# Patient Record
Sex: FEMALE | Race: WHITE
[De-identification: names, ages, dates, MRNs, and addresses within clinical notes are randomized per-mention and may not be internally consistent; named-entity substitution may affect disease eponyms.]

---

## 2017-01-25 ENCOUNTER — HOSPITAL ENCOUNTER (OUTPATIENT)
Dept: HOSPITAL 83 - LAB | Age: 47
Discharge: HOME | End: 2017-01-25
Attending: FAMILY MEDICINE
Payer: COMMERCIAL

## 2017-01-25 DIAGNOSIS — C50.919: ICD-10-CM

## 2017-01-25 DIAGNOSIS — I10: ICD-10-CM

## 2017-01-25 DIAGNOSIS — I25.5: ICD-10-CM

## 2017-01-25 DIAGNOSIS — Z00.01: Primary | ICD-10-CM

## 2017-01-25 LAB
ALBUMIN SERPL-MCNC: 3.3 GM/DL (ref 3.1–4.5)
ALP SERPL-CCNC: 116 U/L (ref 45–117)
ALT SERPL W P-5'-P-CCNC: 24 U/L (ref 12–78)
AST SERPL-CCNC: 15 IU/L (ref 3–35)
BASOPHILS # BLD AUTO: 0 10*3/UL (ref 0–0.1)
BASOPHILS NFR BLD AUTO: 0.2 % (ref 0–1)
BUN SERPL-MCNC: 9 MG/DL (ref 7–24)
CHLORIDE SERPL-SCNC: 106 MMOL/L (ref 98–107)
CHOLEST SERPL-MCNC: 210 MG/DL (ref ?–200)
CO2 SERPL-SCNC: 28 MMOL/L (ref 21–32)
EOSINOPHIL # BLD AUTO: 0.1 10*3/UL (ref 0–0.4)
EOSINOPHIL # BLD AUTO: 2.2 % (ref 1–4)
ERYTHROCYTE [DISTWIDTH] IN BLOOD BY AUTOMATED COUNT: 13.4 % (ref 0–14.5)
EST. AVERAGE GLUCOSE BLD GHB EST-MCNC: 126 MG/DL
GLUCOSE SERPL-MCNC: 78 MG/DL (ref 65–99)
HCT VFR BLD AUTO: 38.7 % (ref 37–47)
HDLC SERPL-MCNC: 61 MG/DL (ref 40–60)
HGB BLD-MCNC: 12.8 G/DL (ref 12–16)
IG #: 0.1 10*3/UL (ref 0–0.1)
LDLC SERPL DIRECT ASSAY-MCNC: 111 MG/DL (ref 9–159)
LYMPHOCYTES # BLD AUTO: 2.2 10*3/UL (ref 1.3–4.4)
LYMPHOCYTES NFR BLD AUTO: 37.2 % (ref 27–41)
MCH RBC QN AUTO: 30.8 PG (ref 27–31)
MCHC RBC AUTO-ENTMCNC: 33.1 G/DL (ref 33–37)
MCV RBC AUTO: 93.3 FL (ref 81–99)
MONOCYTES # BLD AUTO: 0.6 10*3/UL (ref 0.1–1)
MONOCYTES NFR BLD MANUAL: 10.7 % (ref 3–9)
NEUT #: 2.9 10*3/UL (ref 2.3–7.9)
NEUT %: 48.7 % (ref 47–73)
NRBC BLD QL AUTO: 0 % (ref 0–0)
PLATELET # BLD AUTO: 215 10*3/UL (ref 130–400)
PMV BLD AUTO: 9.2 FL (ref 9.6–12.3)
POTASSIUM SERPL-SCNC: 3.9 MMOL/L (ref 3.5–5.1)
PROT SERPL-MCNC: 7.1 GM/DL (ref 6.4–8.2)
RBC # BLD AUTO: 4.15 10*6/UL (ref 4.1–5.1)
SODIUM SERPL-SCNC: 143 MMOL/L (ref 136–145)
TRIGL SERPL-MCNC: 188 MG/DL (ref ?–150)
TSH SERPL DL<=0.005 MIU/L-ACNC: 1.39 UIU/ML (ref 0.36–4.75)
VLDLC SERPL CALC-MCNC: 38 MG/DL (ref 6–40)
WBC NRBC COR # BLD AUTO: 6 10*3/UL (ref 4.8–10.8)

## 2017-03-15 ENCOUNTER — HOSPITAL ENCOUNTER (INPATIENT)
Dept: HOSPITAL 83 - ED | Age: 47
LOS: 3 days | Discharge: HOME | DRG: 193 | End: 2017-03-18
Attending: INTERNAL MEDICINE | Admitting: INTERNAL MEDICINE
Payer: COMMERCIAL

## 2017-03-15 VITALS — HEIGHT: 61.81 IN | WEIGHT: 205.03 LBS | BODY MASS INDEX: 37.73 KG/M2

## 2017-03-15 DIAGNOSIS — J96.01: ICD-10-CM

## 2017-03-15 DIAGNOSIS — I25.2: ICD-10-CM

## 2017-03-15 DIAGNOSIS — Z91.048: ICD-10-CM

## 2017-03-15 DIAGNOSIS — I10: ICD-10-CM

## 2017-03-15 DIAGNOSIS — Z87.01: ICD-10-CM

## 2017-03-15 DIAGNOSIS — Z80.8: ICD-10-CM

## 2017-03-15 DIAGNOSIS — E27.8: ICD-10-CM

## 2017-03-15 DIAGNOSIS — C50.911: ICD-10-CM

## 2017-03-15 DIAGNOSIS — J44.0: ICD-10-CM

## 2017-03-15 DIAGNOSIS — Z71.6: ICD-10-CM

## 2017-03-15 DIAGNOSIS — Z90.13: ICD-10-CM

## 2017-03-15 DIAGNOSIS — D68.59: ICD-10-CM

## 2017-03-15 DIAGNOSIS — J18.9: Primary | ICD-10-CM

## 2017-03-15 DIAGNOSIS — G89.29: ICD-10-CM

## 2017-03-15 DIAGNOSIS — Z79.1: ICD-10-CM

## 2017-03-15 DIAGNOSIS — J44.1: ICD-10-CM

## 2017-03-15 DIAGNOSIS — Z88.8: ICD-10-CM

## 2017-03-15 DIAGNOSIS — F33.9: ICD-10-CM

## 2017-03-15 DIAGNOSIS — Z92.21: ICD-10-CM

## 2017-03-15 DIAGNOSIS — Z79.899: ICD-10-CM

## 2017-03-15 DIAGNOSIS — M25.562: ICD-10-CM

## 2017-03-15 DIAGNOSIS — Z82.49: ICD-10-CM

## 2017-03-15 DIAGNOSIS — J45.909: ICD-10-CM

## 2017-03-15 DIAGNOSIS — Z83.6: ICD-10-CM

## 2017-03-15 DIAGNOSIS — M25.561: ICD-10-CM

## 2017-03-15 DIAGNOSIS — E43: ICD-10-CM

## 2017-03-15 LAB
ALBUMIN SERPL-MCNC: 3.1 GM/DL (ref 3.1–4.5)
ALBUMIN SERPL-MCNC: NEGATIVE G/DL
ALP SERPL-CCNC: 114 U/L (ref 45–117)
ALT SERPL W P-5'-P-CCNC: 19 U/L (ref 12–78)
APPEARANCE UR: (no result)
AST SERPL-CCNC: 13 IU/L (ref 3–35)
BACTERIA #/AREA URNS HPF: (no result) /[HPF]
BASOPHILS # BLD AUTO: 0 10*3/UL (ref 0–0.1)
BASOPHILS NFR BLD AUTO: 0.3 % (ref 0–1)
BILIRUB UR QL STRIP: NEGATIVE
BUN SERPL-MCNC: 11 MG/DL (ref 7–24)
CHLORIDE SERPL-SCNC: 104 MMOL/L (ref 98–107)
CK MB SERPL-MCNC: < 0.5 NG/ML (ref 0.5–3.6)
CK SERPL-CCNC: 53 U/L (ref 26–192)
CK SERPL-CCNC: 53 U/L (ref 26–192)
CK SERPL-CCNC: 66 U/L (ref 26–192)
CO2 SERPL-SCNC: 29 MMOL/L (ref 21–32)
COLOR UR: YELLOW
CRP SERPL-MCNC: 1.31 MG/DL (ref 0–0.3)
EOSINOPHIL # BLD AUTO: 0.2 10*3/UL (ref 0–0.4)
EOSINOPHIL # BLD AUTO: 2.8 % (ref 1–4)
ERYTHROCYTE [DISTWIDTH] IN BLOOD BY AUTOMATED COUNT: 13.2 % (ref 0–14.5)
GLUCOSE SERPL-MCNC: 84 MG/DL (ref 65–99)
GLUCOSE UR QL: NEGATIVE
HCT VFR BLD AUTO: 36.6 % (ref 37–47)
HGB BLD-MCNC: 12 G/DL (ref 12–16)
HGB UR QL STRIP: NEGATIVE
IG #: 0 10*3/UL (ref 0–0.1)
INR BLD: 0.9 (ref 2–3.5)
KETONES UR QL STRIP: NEGATIVE
LEUKOCYTE ESTERASE UR QL STRIP: NEGATIVE
LYMPHOCYTES # BLD AUTO: 2.2 10*3/UL (ref 1.3–4.4)
LYMPHOCYTES NFR BLD AUTO: 35.8 % (ref 27–41)
MAGNESIUM SERPL-MCNC: 1.7 MG/DL (ref 1.5–2.1)
MCH RBC QN AUTO: 30.5 PG (ref 27–31)
MCHC RBC AUTO-ENTMCNC: 32.8 G/DL (ref 33–37)
MCV RBC AUTO: 93.1 FL (ref 81–99)
MONOCYTES # BLD AUTO: 0.8 10*3/UL (ref 0.1–1)
MONOCYTES NFR BLD MANUAL: 13.5 % (ref 3–9)
MUCOUS THREADS URNS QL MICRO: (no result)
MYOGLOBIN SERPL-MCNC: 37 NG/ML (ref 13–71)
NEUT #: 2.9 10*3/UL (ref 2.3–7.9)
NEUT %: 47 % (ref 47–73)
NITRITE UR QL STRIP: NEGATIVE
NRBC BLD QL AUTO: 0 10*3/UL (ref 0–0)
PH UR STRIP: 6 [PH] (ref 5–9)
PLATELET # BLD AUTO: 227 10*3/UL (ref 130–400)
PMV BLD AUTO: 9.1 FL (ref 9.6–12.3)
POTASSIUM SERPL-SCNC: 3.7 MMOL/L (ref 3.5–5.1)
PROT SERPL-MCNC: 6.9 GM/DL (ref 6.4–8.2)
PROTHROMBIN TIME: 10 SECONDS (ref 9–12.4)
RBC # BLD AUTO: 3.93 10*6/UL (ref 4.1–5.1)
RBC #/AREA URNS HPF: (no result) RBC/HPF (ref 0–2)
SODIUM SERPL-SCNC: 141 MMOL/L (ref 136–145)
SP GR UR: 1.02 (ref 1–1.03)
TROPONIN I SERPL-MCNC: < 0.015 NG/ML (ref ?–0.04)
URINE REFLEX COMMENT: YES
UROBILINOGEN UR STRIP-MCNC: 0.2 E.U./DL (ref 0.2–1)
WBC #/AREA URNS HPF: (no result) WBC/HPF (ref 0–5)
WBC NRBC COR # BLD AUTO: 6.2 10*3/UL (ref 4.8–10.8)

## 2017-03-16 LAB
25(OH)D3 SERPL-MCNC: 24.2 NG/ML (ref 30–100)
ALBUMIN SERPL-MCNC: 3.2 GM/DL (ref 3.1–4.5)
ALP SERPL-CCNC: 105 U/L (ref 45–117)
ALT SERPL W P-5'-P-CCNC: 18 U/L (ref 12–78)
AST SERPL-CCNC: 9 IU/L (ref 3–35)
BASOPHILS # BLD AUTO: 0 10*3/UL (ref 0–0.1)
BASOPHILS NFR BLD AUTO: 0.1 % (ref 0–1)
BUN SERPL-MCNC: 12 MG/DL (ref 7–24)
CHLORIDE SERPL-SCNC: 104 MMOL/L (ref 98–107)
CK MB SERPL-MCNC: < 0.5 NG/ML (ref 0.5–3.6)
CK SERPL-CCNC: 65 U/L (ref 26–192)
CO2 SERPL-SCNC: 27 MMOL/L (ref 21–32)
EOSINOPHIL # BLD AUTO: 0 % (ref 1–4)
EOSINOPHIL # BLD AUTO: 0 10*3/UL (ref 0–0.4)
ERYTHROCYTE [DISTWIDTH] IN BLOOD BY AUTOMATED COUNT: 13.2 % (ref 0–14.5)
EST. AVERAGE GLUCOSE BLD GHB EST-MCNC: 114 MG/DL
FOLATE SERPL-MCNC: 6.34 NG/ML (ref 5.38–?)
GLUCOSE SERPL-MCNC: 162 MG/DL (ref 65–99)
HCT VFR BLD AUTO: 36.7 % (ref 37–47)
HGB BLD-MCNC: 11.9 G/DL (ref 12–16)
IG #: 0.1 10*3/UL (ref 0–0.1)
INR BLD: 1 (ref 2–3.5)
LYMPHOCYTES # BLD AUTO: 1 10*3/UL (ref 1.3–4.4)
LYMPHOCYTES NFR BLD AUTO: 9.9 % (ref 27–41)
MAGNESIUM SERPL-MCNC: 2 MG/DL (ref 1.5–2.1)
MCH RBC QN AUTO: 30.4 PG (ref 27–31)
MCHC RBC AUTO-ENTMCNC: 32.4 G/DL (ref 33–37)
MCV RBC AUTO: 93.9 FL (ref 81–99)
MONOCYTES # BLD AUTO: 0.6 10*3/UL (ref 0.1–1)
MONOCYTES NFR BLD MANUAL: 6 % (ref 3–9)
NEUT #: 8.4 10*3/UL (ref 2.3–7.9)
NEUT %: 83.2 % (ref 47–73)
NRBC BLD QL AUTO: 0 % (ref 0–0)
PHOSPHATE SERPL-MCNC: 1.4 MG/DL (ref 2.5–4.9)
PLATELET # BLD AUTO: 247 10*3/UL (ref 130–400)
PMV BLD AUTO: 9.5 FL (ref 9.6–12.3)
POTASSIUM SERPL-SCNC: 3.9 MMOL/L (ref 3.5–5.1)
PROT SERPL-MCNC: 7 GM/DL (ref 6.4–8.2)
PROTHROMBIN TIME: 10.2 SECONDS (ref 9–12.4)
RBC # BLD AUTO: 3.91 10*6/UL (ref 4.1–5.1)
SODIUM SERPL-SCNC: 142 MMOL/L (ref 136–145)
T4 FREE SERPL-MCNC: 0.96 NG/DL (ref 0.76–1.46)
TROPONIN I SERPL-MCNC: < 0.015 NG/ML (ref ?–0.04)
TSH SERPL DL<=0.005 MIU/L-ACNC: 0.22 UIU/ML (ref 0.36–4.75)
VITAMIN B12: 316 PG/ML (ref 247–911)
WBC NRBC COR # BLD AUTO: 10.1 10*3/UL (ref 4.8–10.8)

## 2017-03-17 LAB
ALBUMIN SERPL-MCNC: 3.1 GM/DL (ref 3.1–4.5)
ALP SERPL-CCNC: 93 U/L (ref 45–117)
ALT SERPL W P-5'-P-CCNC: 21 U/L (ref 12–78)
AST SERPL-CCNC: 12 IU/L (ref 3–35)
BASOPHILS # BLD AUTO: 0 10*3/UL (ref 0–0.1)
BASOPHILS NFR BLD AUTO: 0.1 % (ref 0–1)
BUN SERPL-MCNC: 13 MG/DL (ref 7–24)
CHLORIDE SERPL-SCNC: 107 MMOL/L (ref 98–107)
CO2 SERPL-SCNC: 25 MMOL/L (ref 21–32)
EOSINOPHIL # BLD AUTO: 0 % (ref 1–4)
EOSINOPHIL # BLD AUTO: 0 10*3/UL (ref 0–0.4)
ERYTHROCYTE [DISTWIDTH] IN BLOOD BY AUTOMATED COUNT: 13.5 % (ref 0–14.5)
GLUCOSE SERPL-MCNC: 184 MG/DL (ref 65–99)
HCT VFR BLD AUTO: 36.5 % (ref 37–47)
HGB BLD-MCNC: 11.6 G/DL (ref 12–16)
IG #: 0.3 10*3/UL (ref 0–0.1)
INR BLD: 0.9 (ref 2–3.5)
LYMPHOCYTES # BLD AUTO: 1.1 10*3/UL (ref 1.3–4.4)
LYMPHOCYTES NFR BLD AUTO: 7.9 % (ref 27–41)
MCH RBC QN AUTO: 30.1 PG (ref 27–31)
MCHC RBC AUTO-ENTMCNC: 31.8 G/DL (ref 33–37)
MCV RBC AUTO: 94.8 FL (ref 81–99)
MONOCYTES # BLD AUTO: 0.7 10*3/UL (ref 0.1–1)
MONOCYTES NFR BLD MANUAL: 5 % (ref 3–9)
NEUT #: 11.8 10*3/UL (ref 2.3–7.9)
NEUT %: 85.2 % (ref 47–73)
NRBC BLD QL AUTO: 0.1 % (ref 0–0)
PLATELET # BLD AUTO: 227 10*3/UL (ref 130–400)
PMV BLD AUTO: 9.5 FL (ref 9.6–12.3)
POTASSIUM SERPL-SCNC: 3.9 MMOL/L (ref 3.5–5.1)
PROT SERPL-MCNC: 6.7 GM/DL (ref 6.4–8.2)
PROTHROMBIN TIME: 9.9 SECONDS (ref 9–12.4)
RBC # BLD AUTO: 3.85 10*6/UL (ref 4.1–5.1)
SODIUM SERPL-SCNC: 144 MMOL/L (ref 136–145)
TROPONIN I SERPL-MCNC: < 0.015 NG/ML (ref ?–0.04)
WBC NRBC COR # BLD AUTO: 13.9 10*3/UL (ref 4.8–10.8)

## 2017-03-17 PROCEDURE — 3E0U3BZ INTRODUCTION OF ANESTHETIC AGENT INTO JOINTS, PERCUTANEOUS APPROACH: ICD-10-PCS | Performed by: ORTHOPAEDIC SURGERY

## 2017-03-17 PROCEDURE — 3E0U33Z INTRODUCTION OF ANTI-INFLAMMATORY INTO JOINTS, PERCUTANEOUS APPROACH: ICD-10-PCS | Performed by: ORTHOPAEDIC SURGERY

## 2017-03-17 PROCEDURE — 0S9C3ZZ DRAINAGE OF RIGHT KNEE JOINT, PERCUTANEOUS APPROACH: ICD-10-PCS | Performed by: ORTHOPAEDIC SURGERY

## 2017-06-10 ENCOUNTER — HOSPITAL ENCOUNTER (INPATIENT)
Dept: HOSPITAL 83 - ED | Age: 47
LOS: 2 days | Discharge: HOME | DRG: 177 | End: 2017-06-12
Attending: INTERNAL MEDICINE | Admitting: INTERNAL MEDICINE
Payer: COMMERCIAL

## 2017-06-10 VITALS — SYSTOLIC BLOOD PRESSURE: 127 MMHG | DIASTOLIC BLOOD PRESSURE: 79 MMHG

## 2017-06-10 VITALS — SYSTOLIC BLOOD PRESSURE: 116 MMHG | DIASTOLIC BLOOD PRESSURE: 65 MMHG

## 2017-06-10 VITALS — BODY MASS INDEX: 38.29 KG/M2 | WEIGHT: 208.06 LBS | HEIGHT: 61.97 IN

## 2017-06-10 VITALS — SYSTOLIC BLOOD PRESSURE: 122 MMHG | DIASTOLIC BLOOD PRESSURE: 68 MMHG

## 2017-06-10 VITALS — SYSTOLIC BLOOD PRESSURE: 136 MMHG | DIASTOLIC BLOOD PRESSURE: 72 MMHG

## 2017-06-10 VITALS — DIASTOLIC BLOOD PRESSURE: 106 MMHG | SYSTOLIC BLOOD PRESSURE: 143 MMHG

## 2017-06-10 DIAGNOSIS — Z90.13: ICD-10-CM

## 2017-06-10 DIAGNOSIS — Z91.048: ICD-10-CM

## 2017-06-10 DIAGNOSIS — Z79.1: ICD-10-CM

## 2017-06-10 DIAGNOSIS — G62.9: ICD-10-CM

## 2017-06-10 DIAGNOSIS — F17.210: ICD-10-CM

## 2017-06-10 DIAGNOSIS — I25.2: ICD-10-CM

## 2017-06-10 DIAGNOSIS — I10: ICD-10-CM

## 2017-06-10 DIAGNOSIS — K59.03: ICD-10-CM

## 2017-06-10 DIAGNOSIS — J15.6: Primary | ICD-10-CM

## 2017-06-10 DIAGNOSIS — T40.2X5A: ICD-10-CM

## 2017-06-10 DIAGNOSIS — D70.1: ICD-10-CM

## 2017-06-10 DIAGNOSIS — E78.1: ICD-10-CM

## 2017-06-10 DIAGNOSIS — D68.59: ICD-10-CM

## 2017-06-10 DIAGNOSIS — Z79.899: ICD-10-CM

## 2017-06-10 DIAGNOSIS — J96.01: ICD-10-CM

## 2017-06-10 DIAGNOSIS — T82.898A: ICD-10-CM

## 2017-06-10 DIAGNOSIS — F33.9: ICD-10-CM

## 2017-06-10 DIAGNOSIS — G89.29: ICD-10-CM

## 2017-06-10 DIAGNOSIS — J44.9: ICD-10-CM

## 2017-06-10 DIAGNOSIS — E55.9: ICD-10-CM

## 2017-06-10 DIAGNOSIS — Z80.8: ICD-10-CM

## 2017-06-10 DIAGNOSIS — F41.9: ICD-10-CM

## 2017-06-10 DIAGNOSIS — Z79.51: ICD-10-CM

## 2017-06-10 DIAGNOSIS — Z88.8: ICD-10-CM

## 2017-06-10 LAB
ALBUMIN SERPL-MCNC: 3.7 GM/DL (ref 3.1–4.5)
ALBUMIN SERPL-MCNC: NEGATIVE G/DL
ALP SERPL-CCNC: 149 U/L (ref 45–117)
ALT SERPL W P-5'-P-CCNC: 18 U/L (ref 12–78)
APPEARANCE UR: (no result)
AST SERPL-CCNC: 15 IU/L (ref 3–35)
BACTERIA #/AREA URNS HPF: (no result) /[HPF]
BASOPHILS # BLD AUTO: 0 10*3/UL (ref 0–0.1)
BASOPHILS NFR BLD AUTO: 0.4 % (ref 0–1)
BILIRUB UR QL STRIP: NEGATIVE
BUN SERPL-MCNC: 10 MG/DL (ref 7–24)
CHLORIDE SERPL-SCNC: 102 MMOL/L (ref 98–107)
CO2 SERPL-SCNC: 31 MMOL/L (ref 21–32)
COLOR UR: YELLOW
EOSINOPHIL # BLD AUTO: 0.1 10*3/UL (ref 0–0.4)
EOSINOPHIL # BLD AUTO: 2.6 % (ref 1–4)
EPI CELLS #/AREA URNS HPF: (no result) /[HPF]
ERYTHROCYTE [DISTWIDTH] IN BLOOD BY AUTOMATED COUNT: 16.2 % (ref 0–14.5)
GLUCOSE SERPL-MCNC: 76 MG/DL (ref 65–99)
GLUCOSE UR QL: NEGATIVE
HCT VFR BLD AUTO: 36.2 % (ref 37–47)
HGB BLD-MCNC: 12.2 G/DL (ref 12–16)
HGB UR QL STRIP: NEGATIVE
IG #: 0 10*3/UL (ref 0–0.1)
INR BLD: 1 (ref 2–3.5)
KETONES UR QL STRIP: NEGATIVE
LEUKOCYTE ESTERASE UR QL STRIP: NEGATIVE
LYMPHOCYTES # BLD AUTO: 2 10*3/UL (ref 1.3–4.4)
LYMPHOCYTES NFR BLD AUTO: 38.7 % (ref 27–41)
MCH RBC QN AUTO: 31.5 PG (ref 27–31)
MCHC RBC AUTO-ENTMCNC: 33.7 G/DL (ref 33–37)
MCV RBC AUTO: 93.5 FL (ref 81–99)
MONOCYTES # BLD AUTO: 0.7 10*3/UL (ref 0.1–1)
MONOCYTES NFR BLD MANUAL: 14 % (ref 3–9)
NEUT #: 2.2 10*3/UL (ref 2.3–7.9)
NEUT %: 44.1 % (ref 47–73)
NITRITE UR QL STRIP: NEGATIVE
NRBC BLD QL AUTO: 0 % (ref 0–0)
PH UR STRIP: 8 [PH] (ref 5–9)
PLATELET # BLD AUTO: 193 10*3/UL (ref 130–400)
PMV BLD AUTO: 8.7 FL (ref 9.6–12.3)
POTASSIUM SERPL-SCNC: 3.6 MMOL/L (ref 3.5–5.1)
PROT SERPL-MCNC: 7.3 GM/DL (ref 6.4–8.2)
PROTHROMBIN TIME: 10.3 SECONDS (ref 9–12.4)
RBC # BLD AUTO: 3.87 10*6/UL (ref 4.1–5.1)
SODIUM SERPL-SCNC: 142 MMOL/L (ref 136–145)
SP GR UR: 1.01 (ref 1–1.03)
URINE REFLEX COMMENT: NO
UROBILINOGEN UR STRIP-MCNC: 1 E.U./DL (ref 0.2–1)
WBC #/AREA URNS HPF: (no result) WBC/HPF (ref 0–5)
WBC NRBC COR # BLD AUTO: 5.1 10*3/UL (ref 4.8–10.8)

## 2017-06-11 VITALS — DIASTOLIC BLOOD PRESSURE: 89 MMHG

## 2017-06-11 VITALS — DIASTOLIC BLOOD PRESSURE: 40 MMHG

## 2017-06-11 VITALS — DIASTOLIC BLOOD PRESSURE: 98 MMHG

## 2017-06-11 VITALS — DIASTOLIC BLOOD PRESSURE: 57 MMHG

## 2017-06-12 VITALS — DIASTOLIC BLOOD PRESSURE: 67 MMHG | SYSTOLIC BLOOD PRESSURE: 127 MMHG

## 2017-06-12 VITALS — DIASTOLIC BLOOD PRESSURE: 56 MMHG | SYSTOLIC BLOOD PRESSURE: 118 MMHG

## 2017-06-12 LAB
BASOPHILS # BLD AUTO: 0 10*3/UL (ref 0–0.1)
BASOPHILS NFR BLD AUTO: 0.1 % (ref 0–1)
BUN SERPL-MCNC: 17 MG/DL (ref 7–24)
CHLORIDE SERPL-SCNC: 102 MMOL/L (ref 98–107)
CO2 SERPL-SCNC: 29 MMOL/L (ref 21–32)
EOSINOPHIL # BLD AUTO: 0 % (ref 1–4)
EOSINOPHIL # BLD AUTO: 0 10*3/UL (ref 0–0.4)
ERYTHROCYTE [DISTWIDTH] IN BLOOD BY AUTOMATED COUNT: 17.1 % (ref 0–14.5)
GLUCOSE SERPL-MCNC: 171 MG/DL (ref 65–99)
HCT VFR BLD AUTO: 33.6 % (ref 37–47)
HGB BLD-MCNC: 10.8 G/DL (ref 12–16)
IG #: 0.1 10*3/UL (ref 0–0.1)
LYMPHOCYTES # BLD AUTO: 1 10*3/UL (ref 1.3–4.4)
LYMPHOCYTES NFR BLD AUTO: 11.4 % (ref 27–41)
MCH RBC QN AUTO: 31.6 PG (ref 27–31)
MCHC RBC AUTO-ENTMCNC: 32.1 G/DL (ref 33–37)
MCV RBC AUTO: 98.2 FL (ref 81–99)
MONOCYTES # BLD AUTO: 0.4 10*3/UL (ref 0.1–1)
MONOCYTES NFR BLD MANUAL: 4.1 % (ref 3–9)
NEUT #: 7.1 10*3/UL (ref 2.3–7.9)
NEUT %: 83 % (ref 47–73)
NRBC BLD QL AUTO: 0 % (ref 0–0)
PLATELET # BLD AUTO: 178 10*3/UL (ref 130–400)
PMV BLD AUTO: 9.4 FL (ref 9.6–12.3)
POTASSIUM SERPL-SCNC: 4.1 MMOL/L (ref 3.5–5.1)
RBC # BLD AUTO: 3.42 10*6/UL (ref 4.1–5.1)
SODIUM SERPL-SCNC: 140 MMOL/L (ref 136–145)
WBC NRBC COR # BLD AUTO: 8.5 10*3/UL (ref 4.8–10.8)

## 2017-07-13 ENCOUNTER — HOSPITAL ENCOUNTER (OUTPATIENT)
Dept: HOSPITAL 83 - LAB | Age: 47
Discharge: HOME | End: 2017-07-13
Payer: COMMERCIAL

## 2017-07-13 DIAGNOSIS — C50.919: Primary | ICD-10-CM

## 2017-07-13 LAB
ALBUMIN SERPL-MCNC: 3.7 GM/DL (ref 3.1–4.5)
ALP SERPL-CCNC: 173 U/L (ref 45–117)
ALT SERPL W P-5'-P-CCNC: 25 U/L (ref 12–78)
AST SERPL-CCNC: 20 IU/L (ref 3–35)
BASOPHILS # BLD AUTO: 0 10*3/UL (ref 0–0.1)
BASOPHILS NFR BLD AUTO: 0.4 % (ref 0–1)
BUN SERPL-MCNC: 15 MG/DL (ref 7–24)
CHLORIDE SERPL-SCNC: 104 MMOL/L (ref 98–107)
CO2 SERPL-SCNC: 24 MMOL/L (ref 21–32)
EOSINOPHIL # BLD AUTO: 0.2 10*3/UL (ref 0–0.4)
EOSINOPHIL # BLD AUTO: 1.9 % (ref 1–4)
ERYTHROCYTE [DISTWIDTH] IN BLOOD BY AUTOMATED COUNT: 19.4 % (ref 0–14.5)
GLUCOSE SERPL-MCNC: 97 MG/DL (ref 65–99)
HCT VFR BLD AUTO: 36 % (ref 37–47)
HGB BLD-MCNC: 12.3 G/DL (ref 12–16)
IG #: 0.1 10*3/UL (ref 0–0.1)
LYMPHOCYTES # BLD AUTO: 2 10*3/UL (ref 1.3–4.4)
LYMPHOCYTES NFR BLD AUTO: 26 % (ref 27–41)
MCH RBC QN AUTO: 33.5 PG (ref 27–31)
MCHC RBC AUTO-ENTMCNC: 34.2 G/DL (ref 33–37)
MCV RBC AUTO: 98.1 FL (ref 81–99)
MONOCYTES # BLD AUTO: 1.1 10*3/UL (ref 0.1–1)
MONOCYTES NFR BLD MANUAL: 13.6 % (ref 3–9)
NEUT #: 4.4 10*3/UL (ref 2.3–7.9)
NEUT %: 56.5 % (ref 47–73)
NRBC BLD QL AUTO: 0 10*3/UL (ref 0–0)
PLATELET # BLD AUTO: 205 10*3/UL (ref 130–400)
PMV BLD AUTO: 9.1 FL (ref 9.6–12.3)
POTASSIUM SERPL-SCNC: 3.8 MMOL/L (ref 3.5–5.1)
PROT SERPL-MCNC: 7 GM/DL (ref 6.4–8.2)
RBC # BLD AUTO: 3.67 10*6/UL (ref 4.1–5.1)
SODIUM SERPL-SCNC: 138 MMOL/L (ref 136–145)
WBC NRBC COR # BLD AUTO: 7.7 10*3/UL (ref 4.8–10.8)

## 2017-08-02 ENCOUNTER — HOSPITAL ENCOUNTER (INPATIENT)
Dept: HOSPITAL 83 - ED | Age: 47
LOS: 5 days | Discharge: HOME HEALTH | DRG: 190 | End: 2017-08-07
Attending: INTERNAL MEDICINE | Admitting: INTERNAL MEDICINE
Payer: COMMERCIAL

## 2017-08-02 VITALS — BODY MASS INDEX: 38.47 KG/M2 | HEIGHT: 61.97 IN | WEIGHT: 209.06 LBS

## 2017-08-02 VITALS — SYSTOLIC BLOOD PRESSURE: 103 MMHG | DIASTOLIC BLOOD PRESSURE: 64 MMHG

## 2017-08-02 VITALS — DIASTOLIC BLOOD PRESSURE: 90 MMHG | SYSTOLIC BLOOD PRESSURE: 142 MMHG

## 2017-08-02 VITALS — DIASTOLIC BLOOD PRESSURE: 90 MMHG

## 2017-08-02 VITALS — DIASTOLIC BLOOD PRESSURE: 83 MMHG

## 2017-08-02 DIAGNOSIS — Z88.8: ICD-10-CM

## 2017-08-02 DIAGNOSIS — R73.9: ICD-10-CM

## 2017-08-02 DIAGNOSIS — I10: ICD-10-CM

## 2017-08-02 DIAGNOSIS — E44.0: ICD-10-CM

## 2017-08-02 DIAGNOSIS — M54.5: ICD-10-CM

## 2017-08-02 DIAGNOSIS — J18.9: ICD-10-CM

## 2017-08-02 DIAGNOSIS — G89.29: ICD-10-CM

## 2017-08-02 DIAGNOSIS — J20.9: ICD-10-CM

## 2017-08-02 DIAGNOSIS — M19.90: ICD-10-CM

## 2017-08-02 DIAGNOSIS — Z92.21: ICD-10-CM

## 2017-08-02 DIAGNOSIS — J44.0: Primary | ICD-10-CM

## 2017-08-02 DIAGNOSIS — I25.2: ICD-10-CM

## 2017-08-02 DIAGNOSIS — J45.51: ICD-10-CM

## 2017-08-02 DIAGNOSIS — Z80.8: ICD-10-CM

## 2017-08-02 DIAGNOSIS — Z82.3: ICD-10-CM

## 2017-08-02 DIAGNOSIS — E66.9: ICD-10-CM

## 2017-08-02 DIAGNOSIS — R74.0: ICD-10-CM

## 2017-08-02 DIAGNOSIS — F32.9: ICD-10-CM

## 2017-08-02 DIAGNOSIS — F41.1: ICD-10-CM

## 2017-08-02 DIAGNOSIS — J44.1: ICD-10-CM

## 2017-08-02 DIAGNOSIS — Z90.13: ICD-10-CM

## 2017-08-02 DIAGNOSIS — Z91.048: ICD-10-CM

## 2017-08-02 DIAGNOSIS — Z83.3: ICD-10-CM

## 2017-08-02 DIAGNOSIS — C50.919: ICD-10-CM

## 2017-08-02 DIAGNOSIS — Z90.721: ICD-10-CM

## 2017-08-02 DIAGNOSIS — Z79.899: ICD-10-CM

## 2017-08-02 DIAGNOSIS — D63.8: ICD-10-CM

## 2017-08-02 DIAGNOSIS — F17.210: ICD-10-CM

## 2017-08-02 DIAGNOSIS — K21.9: ICD-10-CM

## 2017-08-02 DIAGNOSIS — Z82.49: ICD-10-CM

## 2017-08-02 LAB
ALBUMIN SERPL-MCNC: (no result) G/DL
ALBUMIN SERPL-MCNC: 3.4 GM/DL (ref 3.1–4.5)
ALP SERPL-CCNC: 151 U/L (ref 45–117)
ALT SERPL W P-5'-P-CCNC: 28 U/L (ref 12–78)
APPEARANCE UR: CLEAR
AST SERPL-CCNC: 43 IU/L (ref 3–35)
BACTERIA #/AREA URNS HPF: (no result) /[HPF]
BASOPHILS # BLD AUTO: 0 10*3/UL (ref 0–0.1)
BASOPHILS NFR BLD AUTO: 0.4 % (ref 0–1)
BILIRUB UR QL STRIP: NEGATIVE
BUN SERPL-MCNC: 9 MG/DL (ref 7–24)
CHLORIDE SERPL-SCNC: 101 MMOL/L (ref 98–107)
CO2 SERPL-SCNC: 27 MMOL/L (ref 21–32)
COLOR UR: YELLOW
EOSINOPHIL # BLD AUTO: 0.1 10*3/UL (ref 0–0.4)
EOSINOPHIL # BLD AUTO: 2.1 % (ref 1–4)
EPI CELLS #/AREA URNS HPF: (no result) /[HPF]
ERYTHROCYTE [DISTWIDTH] IN BLOOD BY AUTOMATED COUNT: 19.8 % (ref 0–14.5)
GLUCOSE SERPL-MCNC: 120 MG/DL (ref 65–99)
GLUCOSE UR QL: NEGATIVE
HCT VFR BLD AUTO: 31.3 % (ref 37–47)
HGB BLD-MCNC: 10.2 G/DL (ref 12–16)
HGB UR QL STRIP: NEGATIVE
IG #: 0.1 10*3/UL (ref 0–0.1)
INR BLD: 1 (ref 2–3.5)
KETONES UR QL STRIP: NEGATIVE
LA>2 REFLEX 2 HR: (no result)
LEUKOCYTE ESTERASE UR QL STRIP: NEGATIVE
LYMPHOCYTES # BLD AUTO: 0.8 10*3/UL (ref 1.3–4.4)
LYMPHOCYTES NFR BLD AUTO: 14.8 % (ref 27–41)
MCH RBC QN AUTO: 34.3 PG (ref 27–31)
MCHC RBC AUTO-ENTMCNC: 32.6 G/DL (ref 33–37)
MCV RBC AUTO: 105.4 FL (ref 81–99)
MONOCYTES # BLD AUTO: 0.2 10*3/UL (ref 0.1–1)
MONOCYTES NFR BLD MANUAL: 3.9 % (ref 3–9)
NEUT #: 4.4 10*3/UL (ref 2.3–7.9)
NEUT %: 76.5 % (ref 47–73)
NITRITE UR QL STRIP: NEGATIVE
NRBC BLD QL AUTO: 0.9 % (ref 0–0)
PH UR STRIP: 7.5 [PH] (ref 5–9)
PLATELET # BLD AUTO: 197 10*3/UL (ref 130–400)
PMV BLD AUTO: 9.1 FL (ref 9.6–12.3)
POTASSIUM SERPL-SCNC: 4.4 MMOL/L (ref 3.5–5.1)
PROT SERPL-MCNC: 6.7 GM/DL (ref 6.4–8.2)
PROTHROMBIN TIME: 10.2 SECONDS (ref 8.9–12.2)
RBC # BLD AUTO: 2.97 10*6/UL (ref 4.1–5.1)
RBC #/AREA URNS HPF: (no result) RBC/HPF (ref 0–2)
SODIUM SERPL-SCNC: 139 MMOL/L (ref 136–145)
SP GR UR: 1.01 (ref 1–1.03)
TROPONIN I SERPL-MCNC: < 0.015 NG/ML (ref ?–0.04)
URINE REFLEX COMMENT: NO
UROBILINOGEN UR STRIP-MCNC: 2 E.U./DL (ref 0.2–1)
WBC #/AREA URNS HPF: (no result) WBC/HPF (ref 0–5)
WBC NRBC COR # BLD AUTO: 5.7 10*3/UL (ref 4.8–10.8)

## 2017-08-03 VITALS — DIASTOLIC BLOOD PRESSURE: 80 MMHG

## 2017-08-03 VITALS — DIASTOLIC BLOOD PRESSURE: 84 MMHG

## 2017-08-03 VITALS — SYSTOLIC BLOOD PRESSURE: 137 MMHG | DIASTOLIC BLOOD PRESSURE: 89 MMHG

## 2017-08-03 VITALS — SYSTOLIC BLOOD PRESSURE: 138 MMHG | DIASTOLIC BLOOD PRESSURE: 89 MMHG

## 2017-08-03 VITALS — DIASTOLIC BLOOD PRESSURE: 88 MMHG | SYSTOLIC BLOOD PRESSURE: 139 MMHG

## 2017-08-03 VITALS — DIASTOLIC BLOOD PRESSURE: 58 MMHG

## 2017-08-03 LAB
ALBUMIN SERPL-MCNC: 2.9 GM/DL (ref 3.1–4.5)
ALP SERPL-CCNC: 147 U/L (ref 45–117)
ALT SERPL W P-5'-P-CCNC: 23 U/L (ref 12–78)
AST SERPL-CCNC: 17 IU/L (ref 3–35)
BASOPHILS # BLD AUTO: 0 10*3/UL (ref 0–0.1)
BASOPHILS NFR BLD AUTO: 0.1 % (ref 0–1)
BUN SERPL-MCNC: 10 MG/DL (ref 7–24)
BUN SERPL-MCNC: 9 MG/DL (ref 7–24)
CHLORIDE SERPL-SCNC: 104 MMOL/L (ref 98–107)
CHLORIDE SERPL-SCNC: 107 MMOL/L (ref 98–107)
CO2 SERPL-SCNC: 25 MMOL/L (ref 21–32)
CO2 SERPL-SCNC: 29 MMOL/L (ref 21–32)
EOSINOPHIL # BLD AUTO: 0 % (ref 1–4)
EOSINOPHIL # BLD AUTO: 0 10*3/UL (ref 0–0.4)
ERYTHROCYTE [DISTWIDTH] IN BLOOD BY AUTOMATED COUNT: 19.7 % (ref 0–14.5)
GLUCOSE SERPL-MCNC: 198 MG/DL (ref 65–99)
GLUCOSE SERPL-MCNC: 201 MG/DL (ref 65–99)
HCT VFR BLD AUTO: 29.7 % (ref 37–47)
HGB BLD-MCNC: 9.8 G/DL (ref 12–16)
IG #: 0.1 10*3/UL (ref 0–0.1)
INR BLD: 1 (ref 2–3.5)
LA>2 REFLEX 2 HR: (no result)
LA>2 REFLEX 4 HR: (no result)
LA>2 REFLEX 4 HR: (no result)
LA>2 RFLX FOLLOW UP AT 2 HRS: 2.1 MMOL/L (ref 0.4–2)
LA>2 RFLX FOLLOW UP AT 2 HRS: 2.6 MMOL/L (ref 0.4–2)
LYMPHOCYTES # BLD AUTO: 0.8 10*3/UL (ref 1.3–4.4)
LYMPHOCYTES NFR BLD AUTO: 10.3 % (ref 27–41)
MAGNESIUM SERPL-MCNC: 1.6 MG/DL (ref 1.5–2.1)
MCH RBC QN AUTO: 34.8 PG (ref 27–31)
MCHC RBC AUTO-ENTMCNC: 33 G/DL (ref 33–37)
MCV RBC AUTO: 105.3 FL (ref 81–99)
MONOCYTES # BLD AUTO: 0.3 10*3/UL (ref 0.1–1)
MONOCYTES NFR BLD MANUAL: 4 % (ref 3–9)
NEUT #: 6.5 10*3/UL (ref 2.3–7.9)
NEUT %: 84.3 % (ref 47–73)
NRBC BLD QL AUTO: 0.3 % (ref 0–0)
PHOSPHATE SERPL-MCNC: 1.6 MG/DL (ref 2.5–4.9)
PLATELET # BLD AUTO: 187 10*3/UL (ref 130–400)
PMV BLD AUTO: 9.6 FL (ref 9.6–12.3)
POTASSIUM SERPL-SCNC: 3.7 MMOL/L (ref 3.5–5.1)
POTASSIUM SERPL-SCNC: 3.9 MMOL/L (ref 3.5–5.1)
PROT SERPL-MCNC: 6.1 GM/DL (ref 6.4–8.2)
PROTHROMBIN TIME: 10.2 SECONDS (ref 8.9–12.2)
RBC # BLD AUTO: 2.82 10*6/UL (ref 4.1–5.1)
SODIUM SERPL-SCNC: 141 MMOL/L (ref 136–145)
SODIUM SERPL-SCNC: 141 MMOL/L (ref 136–145)
WBC NRBC COR # BLD AUTO: 7.7 10*3/UL (ref 4.8–10.8)

## 2017-08-04 VITALS — DIASTOLIC BLOOD PRESSURE: 76 MMHG

## 2017-08-04 VITALS — SYSTOLIC BLOOD PRESSURE: 135 MMHG | DIASTOLIC BLOOD PRESSURE: 93 MMHG

## 2017-08-04 VITALS — DIASTOLIC BLOOD PRESSURE: 95 MMHG

## 2017-08-04 VITALS — DIASTOLIC BLOOD PRESSURE: 85 MMHG

## 2017-08-04 LAB
ALBUMIN SERPL-MCNC: 3.1 GM/DL (ref 3.1–4.5)
ALP SERPL-CCNC: 117 U/L (ref 45–117)
ALT SERPL W P-5'-P-CCNC: 19 U/L (ref 12–78)
AST SERPL-CCNC: 8 IU/L (ref 3–35)
BASOPHILS # BLD AUTO: 0 10*3/UL (ref 0–0.1)
BASOPHILS NFR BLD AUTO: 0.1 % (ref 0–1)
BUN SERPL-MCNC: 9 MG/DL (ref 7–24)
CHLORIDE SERPL-SCNC: 110 MMOL/L (ref 98–107)
CO2 SERPL-SCNC: 24 MMOL/L (ref 21–32)
EOSINOPHIL # BLD AUTO: 0 % (ref 1–4)
EOSINOPHIL # BLD AUTO: 0 10*3/UL (ref 0–0.4)
ERYTHROCYTE [DISTWIDTH] IN BLOOD BY AUTOMATED COUNT: 20.4 % (ref 0–14.5)
GLUCOSE SERPL-MCNC: 188 MG/DL (ref 65–99)
HCT VFR BLD AUTO: 28 % (ref 37–47)
HGB BLD-MCNC: 9.2 G/DL (ref 12–16)
IG #: 0.1 10*3/UL (ref 0–0.1)
LYMPHOCYTES # BLD AUTO: 0.9 10*3/UL (ref 1.3–4.4)
LYMPHOCYTES NFR BLD AUTO: 6.8 % (ref 27–41)
MCH RBC QN AUTO: 34.7 PG (ref 27–31)
MCHC RBC AUTO-ENTMCNC: 32.9 G/DL (ref 33–37)
MCV RBC AUTO: 105.7 FL (ref 81–99)
MONOCYTES # BLD AUTO: 1 10*3/UL (ref 0.1–1)
MONOCYTES NFR BLD MANUAL: 7.4 % (ref 3–9)
NEUT #: 11.4 10*3/UL (ref 2.3–7.9)
NEUT %: 84.7 % (ref 47–73)
NRBC BLD QL AUTO: 0.1 % (ref 0–0)
PLATELET # BLD AUTO: 191 10*3/UL (ref 130–400)
PMV BLD AUTO: 9.6 FL (ref 9.6–12.3)
POTASSIUM SERPL-SCNC: 3.7 MMOL/L (ref 3.5–5.1)
PROT SERPL-MCNC: 5.8 GM/DL (ref 6.4–8.2)
RBC # BLD AUTO: 2.65 10*6/UL (ref 4.1–5.1)
SODIUM SERPL-SCNC: 141 MMOL/L (ref 136–145)
WBC NRBC COR # BLD AUTO: 13.5 10*3/UL (ref 4.8–10.8)

## 2017-08-05 VITALS — DIASTOLIC BLOOD PRESSURE: 90 MMHG

## 2017-08-05 VITALS — DIASTOLIC BLOOD PRESSURE: 87 MMHG | SYSTOLIC BLOOD PRESSURE: 151 MMHG

## 2017-08-05 VITALS — DIASTOLIC BLOOD PRESSURE: 81 MMHG

## 2017-08-05 VITALS — DIASTOLIC BLOOD PRESSURE: 80 MMHG | SYSTOLIC BLOOD PRESSURE: 160 MMHG

## 2017-08-05 VITALS — SYSTOLIC BLOOD PRESSURE: 151 MMHG | DIASTOLIC BLOOD PRESSURE: 85 MMHG

## 2017-08-05 LAB
ALBUMIN SERPL-MCNC: 3.1 GM/DL (ref 3.1–4.5)
ALP SERPL-CCNC: 121 U/L (ref 45–117)
ALT SERPL W P-5'-P-CCNC: 20 U/L (ref 12–78)
AST SERPL-CCNC: 12 IU/L (ref 3–35)
BASOPHILS # BLD AUTO: 0 10*3/UL (ref 0–0.1)
BUN SERPL-MCNC: 11 MG/DL (ref 7–24)
CHLORIDE SERPL-SCNC: 108 MMOL/L (ref 98–107)
CO2 SERPL-SCNC: 26 MMOL/L (ref 21–32)
EOSINOPHIL # BLD AUTO: 0.1 10*3/UL (ref 0–0.4)
EOSINOPHIL NFR BLD: 1 % (ref 1–4)
ERYTHROCYTE [DISTWIDTH] IN BLOOD BY AUTOMATED COUNT: 20 % (ref 0–14.5)
GLUCOSE SERPL-MCNC: 115 MG/DL (ref 65–99)
HCT VFR BLD AUTO: 28.7 % (ref 37–47)
HGB BLD-MCNC: 9.5 G/DL (ref 12–16)
LYMPHOCYTES # BLD AUTO: 0.7 10*3/UL (ref 1.3–4.4)
MACROCYTES BLD QL SMEAR: (no result)
MCH RBC QN AUTO: 35.2 PG (ref 27–31)
MCHC RBC AUTO-ENTMCNC: 33.1 G/DL (ref 33–37)
MCV RBC AUTO: 106.3 FL (ref 81–99)
METAMYELOCYTES NFR BLD: 2 % (ref 0–0)
MONOCYTES # BLD AUTO: 0.6 10*3/UL (ref 0.1–1)
MYELOCYTES NFR BLD: 1 % (ref 0–0)
NEUTROPHILS # BLD AUTO: 7.9 10*3/UL (ref 2.3–7.9)
NEUTROPHILS NFR BLD AUTO: 83 % (ref 47–73)
NRBC BLD QL AUTO: 0 10*3/UL (ref 0–0)
PLATELET # BLD AUTO: 161 10*3/UL (ref 130–400)
PLATELET SUFFICIENCY: NORMAL
PMV BLD AUTO: 9.2 FL (ref 9.6–12.3)
POLYCHROMASIA BLD QL SMEAR: SLIGHT
POTASSIUM SERPL-SCNC: 4 MMOL/L (ref 3.5–5.1)
PROT SERPL-MCNC: 6.1 GM/DL (ref 6.4–8.2)
RBC # BLD AUTO: 2.7 10*6/UL (ref 4.1–5.1)
SODIUM SERPL-SCNC: 142 MMOL/L (ref 136–145)
TOTAL CELLS COUNTED: 100 #CELLS
VARIANT LYMPHS NFR BLD MANUAL: 1 % (ref 0–0)
WBC NRBC COR # BLD AUTO: 9.5 10*3/UL (ref 4.8–10.8)

## 2017-08-06 VITALS — DIASTOLIC BLOOD PRESSURE: 80 MMHG | SYSTOLIC BLOOD PRESSURE: 148 MMHG

## 2017-08-06 VITALS — DIASTOLIC BLOOD PRESSURE: 81 MMHG | SYSTOLIC BLOOD PRESSURE: 152 MMHG

## 2017-08-06 VITALS — DIASTOLIC BLOOD PRESSURE: 78 MMHG

## 2017-08-06 VITALS — DIASTOLIC BLOOD PRESSURE: 74 MMHG

## 2017-08-06 VITALS — DIASTOLIC BLOOD PRESSURE: 93 MMHG

## 2017-08-06 LAB
BASOPHILS # BLD AUTO: 0 10*3/UL (ref 0–0.1)
EOSINOPHIL # BLD AUTO: 0 10*3/UL (ref 0–0.4)
ERYTHROCYTE [DISTWIDTH] IN BLOOD BY AUTOMATED COUNT: 18.7 % (ref 0–14.5)
HCT VFR BLD AUTO: 29.4 % (ref 37–47)
HGB BLD-MCNC: 10 G/DL (ref 12–16)
LYMPHOCYTES # BLD AUTO: 0.3 10*3/UL (ref 1.3–4.4)
MACROCYTES BLD QL SMEAR: SLIGHT
MCH RBC QN AUTO: 35.6 PG (ref 27–31)
MCHC RBC AUTO-ENTMCNC: 34 G/DL (ref 33–37)
MCV RBC AUTO: 104.6 FL (ref 81–99)
METAMYELOCYTES NFR BLD: 1 % (ref 0–0)
MONOCYTES # BLD AUTO: 0.6 10*3/UL (ref 0.1–1)
MYELOCYTES NFR BLD: 2 % (ref 0–0)
NEUTROPHILS # BLD AUTO: 7 10*3/UL (ref 2.3–7.9)
NEUTROPHILS NFR BLD AUTO: 86 % (ref 47–73)
NRBC BLD QL AUTO: 0.9 % (ref 0–0)
PLATELET # BLD AUTO: 165 10*3/UL (ref 130–400)
PLATELET SUFFICIENCY: NORMAL
PMV BLD AUTO: 9.6 FL (ref 9.6–12.3)
RBC # BLD AUTO: 2.81 10*6/UL (ref 4.1–5.1)
TOTAL CELLS COUNTED: 100 #CELLS
WBC NRBC COR # BLD AUTO: 8.1 10*3/UL (ref 4.8–10.8)

## 2017-08-07 VITALS — DIASTOLIC BLOOD PRESSURE: 95 MMHG | SYSTOLIC BLOOD PRESSURE: 151 MMHG

## 2017-08-07 VITALS — DIASTOLIC BLOOD PRESSURE: 73 MMHG

## 2017-09-14 ENCOUNTER — HOSPITAL ENCOUNTER (OUTPATIENT)
Dept: HOSPITAL 83 - LAB | Age: 47
Discharge: HOME | End: 2017-09-14
Attending: INTERNAL MEDICINE
Payer: COMMERCIAL

## 2017-09-14 DIAGNOSIS — C50.919: Primary | ICD-10-CM

## 2017-09-14 LAB
ALBUMIN SERPL-MCNC: 3.4 GM/DL (ref 3.1–4.5)
ALP SERPL-CCNC: 125 U/L (ref 45–117)
ALT SERPL W P-5'-P-CCNC: 15 U/L (ref 12–78)
AST SERPL-CCNC: 15 IU/L (ref 3–35)
BASOPHILS # BLD AUTO: 0 10*3/UL (ref 0–0.1)
BASOPHILS NFR BLD AUTO: 0.3 % (ref 0–1)
BUN SERPL-MCNC: 14 MG/DL (ref 7–24)
CHLORIDE SERPL-SCNC: 102 MMOL/L (ref 98–107)
CREAT SERPL-MCNC: 0.99 MG/DL (ref 0.55–1.02)
EOSINOPHIL # BLD AUTO: 0.2 10*3/UL (ref 0–0.4)
EOSINOPHIL # BLD AUTO: 3.1 % (ref 1–4)
ERYTHROCYTE [DISTWIDTH] IN BLOOD BY AUTOMATED COUNT: 14.9 % (ref 0–14.5)
HCT VFR BLD AUTO: 36.8 % (ref 37–47)
HGB BLD-MCNC: 12 G/DL (ref 12–16)
LYMPHOCYTES # BLD AUTO: 2.3 10*3/UL (ref 1.3–4.4)
LYMPHOCYTES NFR BLD AUTO: 32.9 % (ref 27–41)
MCH RBC QN AUTO: 34.6 PG (ref 27–31)
MCHC RBC AUTO-ENTMCNC: 32.6 G/DL (ref 33–37)
MCV RBC AUTO: 106.1 FL (ref 81–99)
MONOCYTES # BLD AUTO: 1.1 10*3/UL (ref 0.1–1)
MONOCYTES NFR BLD MANUAL: 15 % (ref 3–9)
NEUT #: 3.4 10*3/UL (ref 2.3–7.9)
NEUT %: 47.7 % (ref 47–73)
NRBC BLD QL AUTO: 0 10*3/UL (ref 0–0)
PLATELET # BLD AUTO: 270 10*3/UL (ref 130–400)
PMV BLD AUTO: 9.5 FL (ref 9.6–12.3)
POTASSIUM SERPL-SCNC: 3.9 MMOL/L (ref 3.5–5.1)
PROT SERPL-MCNC: 7.7 GM/DL (ref 6.4–8.2)
RBC # BLD AUTO: 3.47 10*6/UL (ref 4.1–5.1)
SODIUM SERPL-SCNC: 137 MMOL/L (ref 136–145)
WBC NRBC COR # BLD AUTO: 7.1 10*3/UL (ref 4.8–10.8)

## 2017-10-17 ENCOUNTER — HOSPITAL ENCOUNTER (OUTPATIENT)
Dept: HOSPITAL 83 - MRI | Age: 47
End: 2017-10-17
Payer: COMMERCIAL

## 2017-10-17 DIAGNOSIS — M12.88: ICD-10-CM

## 2017-10-17 DIAGNOSIS — M47.816: ICD-10-CM

## 2017-10-17 DIAGNOSIS — M51.36: Primary | ICD-10-CM

## 2017-10-17 DIAGNOSIS — M47.22: ICD-10-CM

## 2017-11-28 ENCOUNTER — HOSPITAL ENCOUNTER (OUTPATIENT)
Dept: HOSPITAL 83 - MEDIPORT | Age: 47
Discharge: HOME | End: 2017-11-28
Attending: INTERNAL MEDICINE
Payer: COMMERCIAL

## 2017-11-28 DIAGNOSIS — C50.919: Primary | ICD-10-CM

## 2017-11-28 NOTE — NUR
PT HERE FOR MEDIPORT FLUSH AS ORDERED. ACCESSED WITH NONCORING NEEDLE PER P&P.
FLUSHED EASILY WITHOUT PROBLEM. VERY SLUGGISH BLOOD RETURN. NEEDLE REMOVED. NO
BLEEDING NOTED. DRESSING APPLIED. VIDYA AMBRIZ RN

## 2018-01-15 ENCOUNTER — HOSPITAL ENCOUNTER (OUTPATIENT)
Dept: HOSPITAL 83 - MEDIPORT | Age: 48
Discharge: HOME | End: 2018-01-15
Attending: INTERNAL MEDICINE
Payer: COMMERCIAL

## 2018-01-15 DIAGNOSIS — C50.919: Primary | ICD-10-CM

## 2018-04-18 ENCOUNTER — HOSPITAL ENCOUNTER (INPATIENT)
Dept: HOSPITAL 83 - ED | Age: 48
LOS: 4 days | Discharge: HOME | DRG: 372 | End: 2018-04-22
Attending: INTERNAL MEDICINE | Admitting: INTERNAL MEDICINE
Payer: COMMERCIAL

## 2018-04-18 VITALS — DIASTOLIC BLOOD PRESSURE: 76 MMHG

## 2018-04-18 VITALS — DIASTOLIC BLOOD PRESSURE: 90 MMHG

## 2018-04-18 VITALS — DIASTOLIC BLOOD PRESSURE: 90 MMHG | SYSTOLIC BLOOD PRESSURE: 135 MMHG

## 2018-04-18 VITALS — DIASTOLIC BLOOD PRESSURE: 69 MMHG

## 2018-04-18 VITALS — DIASTOLIC BLOOD PRESSURE: 91 MMHG

## 2018-04-18 VITALS — DIASTOLIC BLOOD PRESSURE: 98 MMHG | SYSTOLIC BLOOD PRESSURE: 125 MMHG

## 2018-04-18 VITALS — WEIGHT: 201.5 LBS | HEIGHT: 61.97 IN | BODY MASS INDEX: 37.08 KG/M2

## 2018-04-18 DIAGNOSIS — Z80.8: ICD-10-CM

## 2018-04-18 DIAGNOSIS — E89.0: ICD-10-CM

## 2018-04-18 DIAGNOSIS — K59.00: ICD-10-CM

## 2018-04-18 DIAGNOSIS — Z71.6: ICD-10-CM

## 2018-04-18 DIAGNOSIS — F41.9: ICD-10-CM

## 2018-04-18 DIAGNOSIS — K35.3: Primary | ICD-10-CM

## 2018-04-18 DIAGNOSIS — C50.911: ICD-10-CM

## 2018-04-18 DIAGNOSIS — Z80.2: ICD-10-CM

## 2018-04-18 DIAGNOSIS — G62.9: ICD-10-CM

## 2018-04-18 DIAGNOSIS — I25.10: ICD-10-CM

## 2018-04-18 DIAGNOSIS — Z90.13: ICD-10-CM

## 2018-04-18 DIAGNOSIS — Z88.8: ICD-10-CM

## 2018-04-18 DIAGNOSIS — Z90.722: ICD-10-CM

## 2018-04-18 DIAGNOSIS — R74.8: ICD-10-CM

## 2018-04-18 DIAGNOSIS — I10: ICD-10-CM

## 2018-04-18 DIAGNOSIS — J44.9: ICD-10-CM

## 2018-04-18 DIAGNOSIS — E66.9: ICD-10-CM

## 2018-04-18 DIAGNOSIS — R79.82: ICD-10-CM

## 2018-04-18 DIAGNOSIS — E55.9: ICD-10-CM

## 2018-04-18 DIAGNOSIS — Z72.0: ICD-10-CM

## 2018-04-18 DIAGNOSIS — D68.59: ICD-10-CM

## 2018-04-18 DIAGNOSIS — Z79.899: ICD-10-CM

## 2018-04-18 DIAGNOSIS — G89.29: ICD-10-CM

## 2018-04-18 DIAGNOSIS — N30.10: ICD-10-CM

## 2018-04-18 DIAGNOSIS — I25.2: ICD-10-CM

## 2018-04-18 DIAGNOSIS — R70.0: ICD-10-CM

## 2018-04-18 DIAGNOSIS — F33.9: ICD-10-CM

## 2018-04-18 LAB
ALBUMIN SERPL-MCNC: 3.7 GM/DL (ref 3.1–4.5)
ALP SERPL-CCNC: 137 U/L (ref 45–117)
ALT SERPL W P-5'-P-CCNC: 20 U/L (ref 12–78)
APPEARANCE UR: CLEAR
AST SERPL-CCNC: 11 IU/L (ref 3–35)
B-HCG SERPL-ACNC: 11 MIU/ML (ref 1–3)
BACTERIA #/AREA URNS HPF: (no result) /[HPF]
BASOPHILS # BLD AUTO: 0 10*3/UL (ref 0–0.1)
BASOPHILS NFR BLD AUTO: 0.5 % (ref 0–1)
BILIRUB UR QL STRIP: NEGATIVE
BUN SERPL-MCNC: 9 MG/DL (ref 7–24)
CHLORIDE SERPL-SCNC: 102 MMOL/L (ref 98–107)
COLOR UR: YELLOW
CREAT SERPL-MCNC: 0.84 MG/DL (ref 0.55–1.02)
EOSINOPHIL # BLD AUTO: 0.1 10*3/UL (ref 0–0.4)
EOSINOPHIL # BLD AUTO: 1.4 % (ref 1–4)
EPI CELLS #/AREA URNS HPF: (no result) /[HPF]
ERYTHROCYTE [DISTWIDTH] IN BLOOD BY AUTOMATED COUNT: 13.1 % (ref 0–14.5)
GLUCOSE UR QL: NEGATIVE
HCT VFR BLD AUTO: 42.9 % (ref 37–47)
HGB BLD-MCNC: 14.3 G/DL (ref 12–16)
HGB UR QL STRIP: NEGATIVE
KETONES UR QL STRIP: (no result)
LEUKOCYTE ESTERASE UR QL STRIP: NEGATIVE
LIPASE SERPL-CCNC: 79 U/L (ref 73–393)
LYMPHOCYTES # BLD AUTO: 2.5 10*3/UL (ref 1.3–4.4)
LYMPHOCYTES NFR BLD AUTO: 28.3 % (ref 27–41)
MCH RBC QN AUTO: 30.6 PG (ref 27–31)
MCHC RBC AUTO-ENTMCNC: 33.3 G/DL (ref 33–37)
MCV RBC AUTO: 91.9 FL (ref 81–99)
MONOCYTES # BLD AUTO: 1 10*3/UL (ref 0.1–1)
MONOCYTES NFR BLD MANUAL: 11.4 % (ref 3–9)
MUCOUS THREADS URNS QL MICRO: (no result)
NEUT #: 5 10*3/UL (ref 2.3–7.9)
NEUT %: 57.8 % (ref 47–73)
NITRITE UR QL STRIP: NEGATIVE
NRBC BLD QL AUTO: 0 10*3/UL (ref 0–0)
PH UR STRIP: 5.5 [PH] (ref 5–9)
PLATELET # BLD AUTO: 264 10*3/UL (ref 130–400)
PMV BLD AUTO: 8.9 FL (ref 9.6–12.3)
POTASSIUM SERPL-SCNC: 3.8 MMOL/L (ref 3.5–5.1)
PROT SERPL-MCNC: 7.5 GM/DL (ref 6.4–8.2)
RBC # BLD AUTO: 4.67 10*6/UL (ref 4.1–5.1)
RBC #/AREA URNS HPF: (no result) RBC/HPF (ref 0–2)
SODIUM SERPL-SCNC: 136 MMOL/L (ref 136–145)
SP GR UR: 1.02 (ref 1–1.03)
UROBILINOGEN UR STRIP-MCNC: 0.2 E.U./DL (ref 0.2–1)
WBC NRBC COR # BLD AUTO: 8.7 10*3/UL (ref 4.8–10.8)

## 2018-04-19 VITALS — DIASTOLIC BLOOD PRESSURE: 78 MMHG | SYSTOLIC BLOOD PRESSURE: 117 MMHG

## 2018-04-19 VITALS — SYSTOLIC BLOOD PRESSURE: 143 MMHG | DIASTOLIC BLOOD PRESSURE: 91 MMHG

## 2018-04-19 VITALS — SYSTOLIC BLOOD PRESSURE: 138 MMHG | DIASTOLIC BLOOD PRESSURE: 98 MMHG

## 2018-04-19 VITALS — DIASTOLIC BLOOD PRESSURE: 74 MMHG | SYSTOLIC BLOOD PRESSURE: 128 MMHG

## 2018-04-19 VITALS — DIASTOLIC BLOOD PRESSURE: 66 MMHG

## 2018-04-19 LAB
ALBUMIN SERPL-MCNC: 3.3 GM/DL (ref 3.1–4.5)
ALP SERPL-CCNC: 120 U/L (ref 45–117)
ALT SERPL W P-5'-P-CCNC: 20 U/L (ref 12–78)
APTT PPP: 25.6 SECONDS (ref 20.8–31.5)
AST SERPL-CCNC: 20 IU/L (ref 3–35)
BASOPHILS # BLD AUTO: 0 10*3/UL (ref 0–0.1)
BASOPHILS NFR BLD AUTO: 0.5 % (ref 0–1)
BUN SERPL-MCNC: 9 MG/DL (ref 7–24)
CHLORIDE SERPL-SCNC: 105 MMOL/L (ref 98–107)
CREAT SERPL-MCNC: 0.73 MG/DL (ref 0.55–1.02)
EOSINOPHIL # BLD AUTO: 0.1 10*3/UL (ref 0–0.4)
EOSINOPHIL # BLD AUTO: 2.1 % (ref 1–4)
ERYTHROCYTE [DISTWIDTH] IN BLOOD BY AUTOMATED COUNT: 13.1 % (ref 0–14.5)
HCT VFR BLD AUTO: 36.6 % (ref 37–47)
HGB BLD-MCNC: 12.1 G/DL (ref 12–16)
INR BLD: 1 (ref 2–3.5)
LYMPHOCYTES # BLD AUTO: 1.8 10*3/UL (ref 1.3–4.4)
LYMPHOCYTES NFR BLD AUTO: 26.7 % (ref 27–41)
MCH RBC QN AUTO: 30.8 PG (ref 27–31)
MCHC RBC AUTO-ENTMCNC: 33.1 G/DL (ref 33–37)
MCV RBC AUTO: 93.1 FL (ref 81–99)
MONOCYTES # BLD AUTO: 0.8 10*3/UL (ref 0.1–1)
MONOCYTES NFR BLD MANUAL: 12.5 % (ref 3–9)
NEUT #: 3.8 10*3/UL (ref 2.3–7.9)
NEUT %: 57.7 % (ref 47–73)
NRBC BLD QL AUTO: 0 10*3/UL (ref 0–0)
PHOSPHATE SERPL-MCNC: 3.9 MG/DL (ref 2.5–4.9)
PLATELET # BLD AUTO: 205 10*3/UL (ref 130–400)
PMV BLD AUTO: 9 FL (ref 9.6–12.3)
POTASSIUM SERPL-SCNC: 3.8 MMOL/L (ref 3.5–5.1)
PROT SERPL-MCNC: 6.3 GM/DL (ref 6.4–8.2)
RBC # BLD AUTO: 3.93 10*6/UL (ref 4.1–5.1)
SODIUM SERPL-SCNC: 139 MMOL/L (ref 136–145)
TSH SERPL DL<=0.005 MIU/L-ACNC: 1.68 UIU/ML (ref 0.36–4.75)
WBC NRBC COR # BLD AUTO: 6.6 10*3/UL (ref 4.8–10.8)

## 2018-04-20 VITALS — SYSTOLIC BLOOD PRESSURE: 122 MMHG | DIASTOLIC BLOOD PRESSURE: 53 MMHG

## 2018-04-20 VITALS — DIASTOLIC BLOOD PRESSURE: 80 MMHG

## 2018-04-20 VITALS — DIASTOLIC BLOOD PRESSURE: 93 MMHG

## 2018-04-20 VITALS — SYSTOLIC BLOOD PRESSURE: 134 MMHG | DIASTOLIC BLOOD PRESSURE: 99 MMHG

## 2018-04-20 LAB
ALBUMIN SERPL-MCNC: 2.8 GM/DL (ref 3.1–4.5)
ALP SERPL-CCNC: 113 U/L (ref 45–117)
ALT SERPL W P-5'-P-CCNC: 24 U/L (ref 12–78)
AST SERPL-CCNC: 21 IU/L (ref 3–35)
BASOPHILS # BLD AUTO: 0 10*3/UL (ref 0–0.1)
BASOPHILS NFR BLD AUTO: 0.5 % (ref 0–1)
BUN SERPL-MCNC: 8 MG/DL (ref 7–24)
CHLORIDE SERPL-SCNC: 110 MMOL/L (ref 98–107)
CREAT SERPL-MCNC: 0.76 MG/DL (ref 0.55–1.02)
EOSINOPHIL # BLD AUTO: 0.1 10*3/UL (ref 0–0.4)
EOSINOPHIL # BLD AUTO: 2.6 % (ref 1–4)
ERYTHROCYTE [DISTWIDTH] IN BLOOD BY AUTOMATED COUNT: 13 % (ref 0–14.5)
HCT VFR BLD AUTO: 35.3 % (ref 37–47)
HGB BLD-MCNC: 11.7 G/DL (ref 12–16)
LIPASE SERPL-CCNC: 71 U/L (ref 73–393)
LYMPHOCYTES # BLD AUTO: 1.6 10*3/UL (ref 1.3–4.4)
LYMPHOCYTES NFR BLD AUTO: 36.6 % (ref 27–41)
MCH RBC QN AUTO: 31 PG (ref 27–31)
MCHC RBC AUTO-ENTMCNC: 33.1 G/DL (ref 33–37)
MCV RBC AUTO: 93.4 FL (ref 81–99)
MONOCYTES # BLD AUTO: 0.6 10*3/UL (ref 0.1–1)
MONOCYTES NFR BLD MANUAL: 13.7 % (ref 3–9)
NEUT #: 2 10*3/UL (ref 2.3–7.9)
NEUT %: 46.1 % (ref 47–73)
NRBC BLD QL AUTO: 0 % (ref 0–0)
PHOSPHATE SERPL-MCNC: 3.8 MG/DL (ref 2.5–4.9)
PLATELET # BLD AUTO: 186 10*3/UL (ref 130–400)
PMV BLD AUTO: 9 FL (ref 9.6–12.3)
POTASSIUM SERPL-SCNC: 3.4 MMOL/L (ref 3.5–5.1)
PROT SERPL-MCNC: 5.9 GM/DL (ref 6.4–8.2)
RBC # BLD AUTO: 3.78 10*6/UL (ref 4.1–5.1)
SODIUM SERPL-SCNC: 142 MMOL/L (ref 136–145)
WBC NRBC COR # BLD AUTO: 4.2 10*3/UL (ref 4.8–10.8)

## 2018-04-21 VITALS — SYSTOLIC BLOOD PRESSURE: 134 MMHG | DIASTOLIC BLOOD PRESSURE: 90 MMHG

## 2018-04-21 VITALS — DIASTOLIC BLOOD PRESSURE: 99 MMHG | SYSTOLIC BLOOD PRESSURE: 139 MMHG

## 2018-04-21 VITALS — DIASTOLIC BLOOD PRESSURE: 75 MMHG | SYSTOLIC BLOOD PRESSURE: 136 MMHG

## 2018-04-21 VITALS — DIASTOLIC BLOOD PRESSURE: 86 MMHG

## 2018-04-21 VITALS — DIASTOLIC BLOOD PRESSURE: 80 MMHG

## 2018-04-21 LAB
ALBUMIN SERPL-MCNC: 2.9 GM/DL (ref 3.1–4.5)
ALP SERPL-CCNC: 109 U/L (ref 45–117)
ALT SERPL W P-5'-P-CCNC: 21 U/L (ref 12–78)
AST SERPL-CCNC: 14 IU/L (ref 3–35)
BASOPHILS # BLD AUTO: 0 10*3/UL (ref 0–0.1)
BASOPHILS NFR BLD AUTO: 0.4 % (ref 0–1)
BUN SERPL-MCNC: 13 MG/DL (ref 7–24)
CHLORIDE SERPL-SCNC: 109 MMOL/L (ref 98–107)
CREAT SERPL-MCNC: 0.92 MG/DL (ref 0.55–1.02)
EOSINOPHIL # BLD AUTO: 0.1 10*3/UL (ref 0–0.4)
EOSINOPHIL # BLD AUTO: 1.9 % (ref 1–4)
ERYTHROCYTE [DISTWIDTH] IN BLOOD BY AUTOMATED COUNT: 13.1 % (ref 0–14.5)
HCT VFR BLD AUTO: 35.5 % (ref 37–47)
HGB BLD-MCNC: 11.6 G/DL (ref 12–16)
LYMPHOCYTES # BLD AUTO: 1.1 10*3/UL (ref 1.3–4.4)
LYMPHOCYTES NFR BLD AUTO: 23.7 % (ref 27–41)
MCH RBC QN AUTO: 30.8 PG (ref 27–31)
MCHC RBC AUTO-ENTMCNC: 32.7 G/DL (ref 33–37)
MCV RBC AUTO: 94.2 FL (ref 81–99)
MONOCYTES # BLD AUTO: 0.4 10*3/UL (ref 0.1–1)
MONOCYTES NFR BLD MANUAL: 8.3 % (ref 3–9)
NEUT #: 3.1 10*3/UL (ref 2.3–7.9)
NEUT %: 65.1 % (ref 47–73)
NRBC BLD QL AUTO: 0 10*3/UL (ref 0–0)
PLATELET # BLD AUTO: 210 10*3/UL (ref 130–400)
PMV BLD AUTO: 9.2 FL (ref 9.6–12.3)
POTASSIUM SERPL-SCNC: 4 MMOL/L (ref 3.5–5.1)
PROT SERPL-MCNC: 6.4 GM/DL (ref 6.4–8.2)
RBC # BLD AUTO: 3.77 10*6/UL (ref 4.1–5.1)
SODIUM SERPL-SCNC: 142 MMOL/L (ref 136–145)
WBC NRBC COR # BLD AUTO: 4.8 10*3/UL (ref 4.8–10.8)

## 2018-04-22 VITALS — DIASTOLIC BLOOD PRESSURE: 89 MMHG

## 2018-04-22 VITALS — SYSTOLIC BLOOD PRESSURE: 140 MMHG | DIASTOLIC BLOOD PRESSURE: 90 MMHG

## 2018-04-22 VITALS — DIASTOLIC BLOOD PRESSURE: 84 MMHG | SYSTOLIC BLOOD PRESSURE: 134 MMHG

## 2018-04-22 LAB
ALBUMIN SERPL-MCNC: 3.1 GM/DL (ref 3.1–4.5)
ALP SERPL-CCNC: 103 U/L (ref 45–117)
ALT SERPL W P-5'-P-CCNC: 19 U/L (ref 12–78)
AST SERPL-CCNC: 14 IU/L (ref 3–35)
BASOPHILS # BLD AUTO: 0 10*3/UL (ref 0–0.1)
BASOPHILS NFR BLD AUTO: 0.4 % (ref 0–1)
BUN SERPL-MCNC: 7 MG/DL (ref 7–24)
CHLORIDE SERPL-SCNC: 104 MMOL/L (ref 98–107)
CREAT SERPL-MCNC: 0.75 MG/DL (ref 0.55–1.02)
EOSINOPHIL # BLD AUTO: 0.1 10*3/UL (ref 0–0.4)
EOSINOPHIL # BLD AUTO: 2.4 % (ref 1–4)
ERYTHROCYTE [DISTWIDTH] IN BLOOD BY AUTOMATED COUNT: 13 % (ref 0–14.5)
HCT VFR BLD AUTO: 37.9 % (ref 37–47)
HGB BLD-MCNC: 12.4 G/DL (ref 12–16)
LIPASE SERPL-CCNC: 96 U/L (ref 73–393)
LYMPHOCYTES # BLD AUTO: 1.8 10*3/UL (ref 1.3–4.4)
LYMPHOCYTES NFR BLD AUTO: 40.2 % (ref 27–41)
MCH RBC QN AUTO: 30.9 PG (ref 27–31)
MCHC RBC AUTO-ENTMCNC: 32.7 G/DL (ref 33–37)
MCV RBC AUTO: 94.5 FL (ref 81–99)
MONOCYTES # BLD AUTO: 0.6 10*3/UL (ref 0.1–1)
MONOCYTES NFR BLD MANUAL: 13.1 % (ref 3–9)
NEUT #: 2 10*3/UL (ref 2.3–7.9)
NEUT %: 43.5 % (ref 47–73)
NRBC BLD QL AUTO: 0 % (ref 0–0)
PLATELET # BLD AUTO: 217 10*3/UL (ref 130–400)
PMV BLD AUTO: 9 FL (ref 9.6–12.3)
POTASSIUM SERPL-SCNC: 3.5 MMOL/L (ref 3.5–5.1)
PROT SERPL-MCNC: 6.5 GM/DL (ref 6.4–8.2)
RBC # BLD AUTO: 4.01 10*6/UL (ref 4.1–5.1)
SODIUM SERPL-SCNC: 141 MMOL/L (ref 136–145)
WBC NRBC COR # BLD AUTO: 4.6 10*3/UL (ref 4.8–10.8)

## 2018-09-13 ENCOUNTER — HOSPITAL ENCOUNTER (EMERGENCY)
Dept: HOSPITAL 83 - ED | Age: 48
Discharge: HOME | End: 2018-09-13
Payer: COMMERCIAL

## 2018-09-13 VITALS — DIASTOLIC BLOOD PRESSURE: 83 MMHG

## 2018-09-13 VITALS — HEIGHT: 61.97 IN | BODY MASS INDEX: 36.8 KG/M2 | WEIGHT: 200 LBS

## 2018-09-13 DIAGNOSIS — Z79.899: ICD-10-CM

## 2018-09-13 DIAGNOSIS — I10: ICD-10-CM

## 2018-09-13 DIAGNOSIS — Z88.6: ICD-10-CM

## 2018-09-13 DIAGNOSIS — R19.7: ICD-10-CM

## 2018-09-13 DIAGNOSIS — Z88.8: ICD-10-CM

## 2018-09-13 DIAGNOSIS — R11.2: ICD-10-CM

## 2018-09-13 DIAGNOSIS — F17.200: ICD-10-CM

## 2018-09-13 DIAGNOSIS — Z85.3: ICD-10-CM

## 2018-09-13 DIAGNOSIS — G89.3: Primary | ICD-10-CM

## 2018-09-13 LAB
ALBUMIN SERPL-MCNC: 3.4 GM/DL (ref 3.1–4.5)
ALP SERPL-CCNC: 145 U/L (ref 45–117)
ALT SERPL W P-5'-P-CCNC: 36 U/L (ref 12–78)
APTT PPP: 24.7 SECONDS (ref 20.8–31.5)
AST SERPL-CCNC: 19 IU/L (ref 3–35)
BASOPHILS # BLD AUTO: 0 10*3/UL (ref 0–0.1)
BASOPHILS NFR BLD AUTO: 0.3 % (ref 0–1)
BUN SERPL-MCNC: 10 MG/DL (ref 7–24)
CHLORIDE SERPL-SCNC: 105 MMOL/L (ref 98–107)
CREAT SERPL-MCNC: 0.76 MG/DL (ref 0.55–1.02)
EOSINOPHIL # BLD AUTO: 0.3 10*3/UL (ref 0–0.4)
EOSINOPHIL # BLD AUTO: 3.4 % (ref 1–4)
ERYTHROCYTE [DISTWIDTH] IN BLOOD BY AUTOMATED COUNT: 13.1 % (ref 0–14.5)
HCT VFR BLD AUTO: 39.7 % (ref 37–47)
HGB BLD-MCNC: 13.3 G/DL (ref 12–16)
INR BLD: 1 (ref 2–3.5)
LIPASE SERPL-CCNC: 80 U/L (ref 73–393)
LYMPHOCYTES # BLD AUTO: 1.8 10*3/UL (ref 1.3–4.4)
LYMPHOCYTES NFR BLD AUTO: 23.6 % (ref 27–41)
MCH RBC QN AUTO: 31.1 PG (ref 27–31)
MCHC RBC AUTO-ENTMCNC: 33.5 G/DL (ref 33–37)
MCV RBC AUTO: 92.8 FL (ref 81–99)
MONOCYTES # BLD AUTO: 1 10*3/UL (ref 0.1–1)
MONOCYTES NFR BLD MANUAL: 12.5 % (ref 3–9)
NEUT #: 4.6 10*3/UL (ref 2.3–7.9)
NEUT %: 59.7 % (ref 47–73)
NRBC BLD QL AUTO: 0 % (ref 0–0)
PLATELET # BLD AUTO: 221 10*3/UL (ref 130–400)
PMV BLD AUTO: 9.5 FL (ref 9.6–12.3)
POTASSIUM SERPL-SCNC: 3.7 MMOL/L (ref 3.5–5.1)
PROT SERPL-MCNC: 7.4 GM/DL (ref 6.4–8.2)
RBC # BLD AUTO: 4.28 10*6/UL (ref 4.1–5.1)
SODIUM SERPL-SCNC: 138 MMOL/L (ref 136–145)
TROPONIN I SERPL-MCNC: < 0.015 NG/ML (ref ?–0.04)
WBC NRBC COR # BLD AUTO: 7.7 10*3/UL (ref 4.8–10.8)

## 2018-10-03 ENCOUNTER — HOSPITAL ENCOUNTER (EMERGENCY)
Dept: HOSPITAL 83 - ED | Age: 48
Discharge: HOME | End: 2018-10-03
Payer: COMMERCIAL

## 2018-10-03 VITALS — SYSTOLIC BLOOD PRESSURE: 133 MMHG | DIASTOLIC BLOOD PRESSURE: 95 MMHG

## 2018-10-03 VITALS — BODY MASS INDEX: 38.64 KG/M2 | HEIGHT: 61.97 IN | WEIGHT: 210 LBS

## 2018-10-03 DIAGNOSIS — J44.9: ICD-10-CM

## 2018-10-03 DIAGNOSIS — Z98.890: ICD-10-CM

## 2018-10-03 DIAGNOSIS — F17.200: ICD-10-CM

## 2018-10-03 DIAGNOSIS — Z91.048: ICD-10-CM

## 2018-10-03 DIAGNOSIS — J45.909: ICD-10-CM

## 2018-10-03 DIAGNOSIS — E78.1: ICD-10-CM

## 2018-10-03 DIAGNOSIS — Z79.899: ICD-10-CM

## 2018-10-03 DIAGNOSIS — I25.2: ICD-10-CM

## 2018-10-03 DIAGNOSIS — Z88.8: ICD-10-CM

## 2018-10-03 DIAGNOSIS — E66.9: ICD-10-CM

## 2018-10-03 DIAGNOSIS — R04.0: Primary | ICD-10-CM

## 2018-10-03 DIAGNOSIS — I25.10: ICD-10-CM

## 2018-10-03 DIAGNOSIS — I10: ICD-10-CM

## 2018-10-03 LAB
APTT PPP: 30.7 SECONDS (ref 20.8–31.5)
BASOPHILS # BLD AUTO: 0 10*3/UL (ref 0–0.1)
BASOPHILS NFR BLD AUTO: 0.4 % (ref 0–1)
BUN SERPL-MCNC: 10 MG/DL (ref 7–24)
CHLORIDE SERPL-SCNC: 101 MMOL/L (ref 98–107)
CREAT SERPL-MCNC: 0.75 MG/DL (ref 0.55–1.02)
EOSINOPHIL # BLD AUTO: 0.3 10*3/UL (ref 0–0.4)
EOSINOPHIL # BLD AUTO: 3.7 % (ref 1–4)
ERYTHROCYTE [DISTWIDTH] IN BLOOD BY AUTOMATED COUNT: 12.6 % (ref 0–14.5)
HCT VFR BLD AUTO: 39.9 % (ref 37–47)
HGB BLD-MCNC: 13.8 G/DL (ref 12–16)
INR BLD: 0.9 (ref 2–3.5)
LYMPHOCYTES # BLD AUTO: 1.5 10*3/UL (ref 1.3–4.4)
LYMPHOCYTES NFR BLD AUTO: 20.7 % (ref 27–41)
MCH RBC QN AUTO: 31.4 PG (ref 27–31)
MCHC RBC AUTO-ENTMCNC: 34.6 G/DL (ref 33–37)
MCV RBC AUTO: 90.7 FL (ref 81–99)
MONOCYTES # BLD AUTO: 1.2 10*3/UL (ref 0.1–1)
MONOCYTES NFR BLD MANUAL: 16.6 % (ref 3–9)
NEUT #: 4.2 10*3/UL (ref 2.3–7.9)
NEUT %: 57.8 % (ref 47–73)
NRBC BLD QL AUTO: 0.3 % (ref 0–0)
PLATELET # BLD AUTO: 56 10*3/UL (ref 130–400)
PMV BLD AUTO: 10.2 FL (ref 9.6–12.3)
POTASSIUM SERPL-SCNC: 3.3 MMOL/L (ref 3.5–5.1)
RBC # BLD AUTO: 4.4 10*6/UL (ref 4.1–5.1)
SODIUM SERPL-SCNC: 137 MMOL/L (ref 136–145)
WBC NRBC COR # BLD AUTO: 7.3 10*3/UL (ref 4.8–10.8)

## 2018-10-16 ENCOUNTER — HOSPITAL ENCOUNTER (OUTPATIENT)
Dept: HOSPITAL 83 - LAB | Age: 48
Discharge: HOME | End: 2018-10-16
Attending: INTERNAL MEDICINE
Payer: COMMERCIAL

## 2018-10-16 DIAGNOSIS — C50.919: Primary | ICD-10-CM

## 2018-10-16 LAB
ALBUMIN SERPL-MCNC: 3.6 GM/DL (ref 3.1–4.5)
ALP SERPL-CCNC: 143 U/L (ref 45–117)
ALT SERPL W P-5'-P-CCNC: 47 U/L (ref 12–78)
AST SERPL-CCNC: 27 IU/L (ref 3–35)
BASOPHILS # BLD AUTO: 0 10*3/UL (ref 0–0.1)
BASOPHILS NFR BLD AUTO: 0.4 % (ref 0–1)
BUN SERPL-MCNC: 13 MG/DL (ref 7–24)
CHLORIDE SERPL-SCNC: 102 MMOL/L (ref 98–107)
CREAT SERPL-MCNC: 0.86 MG/DL (ref 0.55–1.02)
EOSINOPHIL # BLD AUTO: 0.2 10*3/UL (ref 0–0.4)
EOSINOPHIL # BLD AUTO: 2.2 % (ref 1–4)
ERYTHROCYTE [DISTWIDTH] IN BLOOD BY AUTOMATED COUNT: 13.2 % (ref 0–14.5)
HCT VFR BLD AUTO: 41.6 % (ref 37–47)
HGB BLD-MCNC: 13.6 G/DL (ref 12–16)
LYMPHOCYTES # BLD AUTO: 2.6 10*3/UL (ref 1.3–4.4)
LYMPHOCYTES NFR BLD AUTO: 32.9 % (ref 27–41)
MCH RBC QN AUTO: 30.8 PG (ref 27–31)
MCHC RBC AUTO-ENTMCNC: 32.7 G/DL (ref 33–37)
MCV RBC AUTO: 94.3 FL (ref 81–99)
MONOCYTES # BLD AUTO: 0.9 10*3/UL (ref 0.1–1)
MONOCYTES NFR BLD MANUAL: 11.1 % (ref 3–9)
NEUT #: 4.1 10*3/UL (ref 2.3–7.9)
NEUT %: 53 % (ref 47–73)
NRBC BLD QL AUTO: 0 % (ref 0–0)
PLATELET # BLD AUTO: 313 10*3/UL (ref 130–400)
PMV BLD AUTO: 9.1 FL (ref 9.6–12.3)
POTASSIUM SERPL-SCNC: 3.7 MMOL/L (ref 3.5–5.1)
PROT SERPL-MCNC: 8.1 GM/DL (ref 6.4–8.2)
RBC # BLD AUTO: 4.41 10*6/UL (ref 4.1–5.1)
SODIUM SERPL-SCNC: 137 MMOL/L (ref 136–145)
WBC NRBC COR # BLD AUTO: 7.8 10*3/UL (ref 4.8–10.8)

## 2018-11-21 ENCOUNTER — HOSPITAL ENCOUNTER (OUTPATIENT)
Dept: HOSPITAL 83 - LAB | Age: 48
Discharge: HOME | End: 2018-11-21
Attending: INTERNAL MEDICINE
Payer: COMMERCIAL

## 2018-11-21 DIAGNOSIS — Z85.3: ICD-10-CM

## 2018-11-21 DIAGNOSIS — R51: ICD-10-CM

## 2018-11-21 DIAGNOSIS — R06.02: ICD-10-CM

## 2018-11-21 DIAGNOSIS — J43.9: Primary | ICD-10-CM

## 2018-11-21 DIAGNOSIS — H92.09: ICD-10-CM

## 2018-11-21 LAB
ALBUMIN SERPL-MCNC: 3.4 GM/DL (ref 3.1–4.5)
ALP SERPL-CCNC: 117 U/L (ref 45–117)
ALT SERPL W P-5'-P-CCNC: 34 U/L (ref 12–78)
AST SERPL-CCNC: 26 IU/L (ref 3–35)
BASOPHILS # BLD AUTO: 0 10*3/UL (ref 0–0.1)
BASOPHILS NFR BLD AUTO: 0.4 % (ref 0–1)
BUN SERPL-MCNC: 12 MG/DL (ref 7–24)
CHLORIDE SERPL-SCNC: 105 MMOL/L (ref 98–107)
CREAT SERPL-MCNC: 0.82 MG/DL (ref 0.55–1.02)
EOSINOPHIL # BLD AUTO: 0.2 10*3/UL (ref 0–0.4)
EOSINOPHIL # BLD AUTO: 2.6 % (ref 1–4)
ERYTHROCYTE [DISTWIDTH] IN BLOOD BY AUTOMATED COUNT: 14.2 % (ref 0–14.5)
HCT VFR BLD AUTO: 38.3 % (ref 37–47)
HGB BLD-MCNC: 12.3 G/DL (ref 12–16)
LYMPHOCYTES # BLD AUTO: 3.1 10*3/UL (ref 1.3–4.4)
LYMPHOCYTES NFR BLD AUTO: 32.7 % (ref 27–41)
MCH RBC QN AUTO: 30.4 PG (ref 27–31)
MCHC RBC AUTO-ENTMCNC: 32.1 G/DL (ref 33–37)
MCV RBC AUTO: 94.6 FL (ref 81–99)
MONOCYTES # BLD AUTO: 1.4 10*3/UL (ref 0.1–1)
MONOCYTES NFR BLD MANUAL: 15.2 % (ref 3–9)
NEUT #: 4.6 10*3/UL (ref 2.3–7.9)
NEUT %: 48.8 % (ref 47–73)
NRBC BLD QL AUTO: 0 10*3/UL (ref 0–0)
PLATELET # BLD AUTO: 234 10*3/UL (ref 130–400)
PMV BLD AUTO: 9.2 FL (ref 9.6–12.3)
POTASSIUM SERPL-SCNC: 4.1 MMOL/L (ref 3.5–5.1)
PROT SERPL-MCNC: 7.4 GM/DL (ref 6.4–8.2)
RBC # BLD AUTO: 4.05 10*6/UL (ref 4.1–5.1)
SODIUM SERPL-SCNC: 138 MMOL/L (ref 136–145)
WBC NRBC COR # BLD AUTO: 9.4 10*3/UL (ref 4.8–10.8)

## 2019-01-14 ENCOUNTER — HOSPITAL ENCOUNTER (EMERGENCY)
Dept: HOSPITAL 83 - ED | Age: 49
Discharge: HOME | End: 2019-01-14
Payer: COMMERCIAL

## 2019-01-14 VITALS — HEIGHT: 61.97 IN | WEIGHT: 207 LBS | BODY MASS INDEX: 38.09 KG/M2

## 2019-01-14 VITALS — SYSTOLIC BLOOD PRESSURE: 137 MMHG | DIASTOLIC BLOOD PRESSURE: 112 MMHG

## 2019-01-14 DIAGNOSIS — Z91.048: ICD-10-CM

## 2019-01-14 DIAGNOSIS — E78.2: ICD-10-CM

## 2019-01-14 DIAGNOSIS — Z76.0: ICD-10-CM

## 2019-01-14 DIAGNOSIS — R07.89: ICD-10-CM

## 2019-01-14 DIAGNOSIS — J44.9: ICD-10-CM

## 2019-01-14 DIAGNOSIS — I25.2: ICD-10-CM

## 2019-01-14 DIAGNOSIS — Z79.82: ICD-10-CM

## 2019-01-14 DIAGNOSIS — F17.200: ICD-10-CM

## 2019-01-14 DIAGNOSIS — C50.911: ICD-10-CM

## 2019-01-14 DIAGNOSIS — I25.10: ICD-10-CM

## 2019-01-14 DIAGNOSIS — Z98.890: ICD-10-CM

## 2019-01-14 DIAGNOSIS — Z91.030: ICD-10-CM

## 2019-01-14 DIAGNOSIS — Z88.8: ICD-10-CM

## 2019-01-14 DIAGNOSIS — Z79.899: ICD-10-CM

## 2019-01-14 DIAGNOSIS — I10: ICD-10-CM

## 2019-01-14 DIAGNOSIS — E66.9: ICD-10-CM

## 2019-01-14 DIAGNOSIS — M54.5: ICD-10-CM

## 2019-01-14 DIAGNOSIS — G89.3: Primary | ICD-10-CM

## 2019-02-10 ENCOUNTER — HOSPITAL ENCOUNTER (EMERGENCY)
Dept: HOSPITAL 83 - ED | Age: 49
Discharge: HOME | End: 2019-02-10
Payer: COMMERCIAL

## 2019-02-10 VITALS — DIASTOLIC BLOOD PRESSURE: 84 MMHG

## 2019-02-10 DIAGNOSIS — Z79.899: ICD-10-CM

## 2019-02-10 DIAGNOSIS — Z88.8: ICD-10-CM

## 2019-02-10 DIAGNOSIS — Z91.030: ICD-10-CM

## 2019-02-10 DIAGNOSIS — I25.2: ICD-10-CM

## 2019-02-10 DIAGNOSIS — R04.0: Primary | ICD-10-CM

## 2019-02-10 DIAGNOSIS — F17.200: ICD-10-CM

## 2019-02-10 DIAGNOSIS — Z79.82: ICD-10-CM

## 2019-02-10 LAB
ALBUMIN SERPL-MCNC: 3.3 GM/DL (ref 3.1–4.5)
ALP SERPL-CCNC: 129 U/L (ref 45–117)
ALT SERPL W P-5'-P-CCNC: 54 U/L (ref 12–78)
AST SERPL-CCNC: 85 IU/L (ref 3–35)
BASOPHILS # BLD AUTO: 0 10*3/UL (ref 0–0.1)
BASOPHILS NFR BLD AUTO: 0.4 % (ref 0–1)
BUN SERPL-MCNC: 10 MG/DL (ref 7–24)
CHLORIDE SERPL-SCNC: 102 MMOL/L (ref 98–107)
CREAT SERPL-MCNC: 0.82 MG/DL (ref 0.55–1.02)
EOSINOPHIL # BLD AUTO: 0.3 10*3/UL (ref 0–0.4)
EOSINOPHIL # BLD AUTO: 3.5 % (ref 1–4)
ERYTHROCYTE [DISTWIDTH] IN BLOOD BY AUTOMATED COUNT: 14.4 % (ref 0–14.5)
HCT VFR BLD AUTO: 38.5 % (ref 37–47)
HGB BLD-MCNC: 12.5 G/DL (ref 12–16)
INR BLD: 0.9 (ref 2–3.5)
LYMPHOCYTES # BLD AUTO: 2.4 10*3/UL (ref 1.3–4.4)
LYMPHOCYTES NFR BLD AUTO: 32 % (ref 27–41)
MCH RBC QN AUTO: 30.2 PG (ref 27–31)
MCHC RBC AUTO-ENTMCNC: 32.5 G/DL (ref 33–37)
MCV RBC AUTO: 93 FL (ref 81–99)
MONOCYTES # BLD AUTO: 1.4 10*3/UL (ref 0.1–1)
MONOCYTES NFR BLD MANUAL: 19.6 % (ref 3–9)
NEUT #: 3.2 10*3/UL (ref 2.3–7.9)
NEUT %: 43.8 % (ref 47–73)
NRBC BLD QL AUTO: 0 % (ref 0–0)
PLATELET # BLD AUTO: 160 10*3/UL (ref 130–400)
PMV BLD AUTO: 9.6 FL (ref 9.6–12.3)
POTASSIUM SERPL-SCNC: 3.6 MMOL/L (ref 3.5–5.1)
PROT SERPL-MCNC: 7.9 GM/DL (ref 6.4–8.2)
RBC # BLD AUTO: 4.14 10*6/UL (ref 4.1–5.1)
SODIUM SERPL-SCNC: 139 MMOL/L (ref 136–145)
WBC NRBC COR # BLD AUTO: 7.3 10*3/UL (ref 4.8–10.8)

## 2019-03-22 ENCOUNTER — TELEPHONE (OUTPATIENT)
Dept: ADMINISTRATIVE | Age: 49
End: 2019-03-22

## 2019-04-05 PROBLEM — I25.10 CORONARY ARTERY DISEASE INVOLVING NATIVE CORONARY ARTERY OF NATIVE HEART WITHOUT ANGINA PECTORIS: Status: ACTIVE | Noted: 2019-04-05

## 2019-04-05 PROBLEM — C50.919 BREAST CANCER (HCC): Status: ACTIVE | Noted: 2019-04-05

## 2019-04-24 ENCOUNTER — OFFICE VISIT (OUTPATIENT)
Dept: CARDIOLOGY CLINIC | Age: 49
End: 2019-04-24
Payer: COMMERCIAL

## 2019-04-24 VITALS
BODY MASS INDEX: 37.91 KG/M2 | WEIGHT: 206 LBS | SYSTOLIC BLOOD PRESSURE: 110 MMHG | RESPIRATION RATE: 16 BRPM | DIASTOLIC BLOOD PRESSURE: 74 MMHG | HEART RATE: 72 BPM | HEIGHT: 62 IN

## 2019-04-24 DIAGNOSIS — C50.919 MALIGNANT NEOPLASM OF FEMALE BREAST, UNSPECIFIED ESTROGEN RECEPTOR STATUS, UNSPECIFIED LATERALITY, UNSPECIFIED SITE OF BREAST (HCC): ICD-10-CM

## 2019-04-24 DIAGNOSIS — I25.10 CORONARY ARTERY DISEASE INVOLVING NATIVE CORONARY ARTERY OF NATIVE HEART WITHOUT ANGINA PECTORIS: ICD-10-CM

## 2019-04-24 PROCEDURE — 93000 ELECTROCARDIOGRAM COMPLETE: CPT | Performed by: INTERNAL MEDICINE

## 2019-04-24 PROCEDURE — G8427 DOCREV CUR MEDS BY ELIG CLIN: HCPCS | Performed by: INTERNAL MEDICINE

## 2019-04-24 PROCEDURE — 99244 OFF/OP CNSLTJ NEW/EST MOD 40: CPT | Performed by: INTERNAL MEDICINE

## 2019-04-24 PROCEDURE — G8417 CALC BMI ABV UP PARAM F/U: HCPCS | Performed by: INTERNAL MEDICINE

## 2019-04-24 RX ORDER — GABAPENTIN 100 MG/1
CAPSULE ORAL
Refills: 2 | COMMUNITY
Start: 2019-03-15

## 2019-04-24 RX ORDER — CLOPIDOGREL BISULFATE 75 MG/1
75 TABLET ORAL DAILY
Qty: 30 TABLET | Refills: 4 | Status: SHIPPED | OUTPATIENT
Start: 2019-04-24

## 2019-04-24 RX ORDER — VARENICLINE TARTRATE 1 MG/1
TABLET, FILM COATED ORAL
Refills: 4 | COMMUNITY
Start: 2019-04-13

## 2019-04-24 RX ORDER — CARVEDILOL 6.25 MG/1
TABLET ORAL
Refills: 5 | COMMUNITY
Start: 2019-04-01 | End: 2019-06-06 | Stop reason: SDUPTHER

## 2019-04-24 RX ORDER — PENTOSAN POLYSULFATE SODIUM 100 MG/1
CAPSULE, GELATIN COATED ORAL
Refills: 6 | COMMUNITY
Start: 2019-02-01

## 2019-04-24 RX ORDER — ATORVASTATIN CALCIUM 20 MG/1
TABLET, FILM COATED ORAL
Refills: 3 | COMMUNITY
Start: 2019-04-01

## 2019-04-24 RX ORDER — ASPIRIN 81 MG/1
TABLET ORAL
Refills: 3 | COMMUNITY
Start: 2019-04-01 | End: 2019-06-06 | Stop reason: SDUPTHER

## 2019-04-24 RX ORDER — LISINOPRIL 2.5 MG/1
TABLET ORAL
Refills: 0 | COMMUNITY
Start: 2019-04-03 | End: 2019-05-01 | Stop reason: SDUPTHER

## 2019-04-24 RX ORDER — DULOXETIN HYDROCHLORIDE 60 MG/1
CAPSULE, DELAYED RELEASE ORAL
Refills: 2 | COMMUNITY
Start: 2019-04-13

## 2019-04-24 RX ORDER — CETIRIZINE HYDROCHLORIDE 10 MG/1
TABLET ORAL
Refills: 2 | COMMUNITY
Start: 2019-02-25

## 2019-04-24 RX ORDER — MORPHINE SULFATE 30 MG/1
TABLET, FILM COATED, EXTENDED RELEASE ORAL
Refills: 0 | COMMUNITY
Start: 2019-04-13

## 2019-04-24 RX ORDER — SENNA PLUS 8.6 MG/1
1 TABLET ORAL PRN
COMMUNITY

## 2019-04-24 RX ORDER — HYDROXYZINE HYDROCHLORIDE 25 MG/1
TABLET, FILM COATED ORAL
Refills: 2 | COMMUNITY
Start: 2019-04-13

## 2019-04-24 RX ORDER — DICYCLOMINE HYDROCHLORIDE 10 MG/1
CAPSULE ORAL
Refills: 2 | COMMUNITY
Start: 2019-04-13

## 2019-04-24 RX ORDER — BUDESONIDE AND FORMOTEROL FUMARATE DIHYDRATE 160; 4.5 UG/1; UG/1
2 AEROSOL RESPIRATORY (INHALATION) 2 TIMES DAILY
COMMUNITY

## 2019-04-24 RX ORDER — OXYCODONE AND ACETAMINOPHEN 10; 325 MG/1; MG/1
TABLET ORAL
Refills: 0 | COMMUNITY
Start: 2019-04-13

## 2019-04-24 RX ORDER — LORAZEPAM 1 MG/1
TABLET ORAL
Refills: 0 | COMMUNITY
Start: 2019-01-23

## 2019-04-24 RX ORDER — OMEPRAZOLE 20 MG/1
CAPSULE, DELAYED RELEASE ORAL
Refills: 6 | COMMUNITY
Start: 2019-04-15

## 2019-04-24 RX ORDER — TICAGRELOR 90 MG/1
TABLET ORAL
Refills: 3 | COMMUNITY
Start: 2019-04-01

## 2019-04-24 RX ORDER — ONDANSETRON 4 MG/1
TABLET, ORALLY DISINTEGRATING ORAL
Refills: 2 | COMMUNITY
Start: 2019-04-13

## 2019-04-24 SDOH — HEALTH STABILITY: MENTAL HEALTH: HOW OFTEN DO YOU HAVE A DRINK CONTAINING ALCOHOL?: NEVER

## 2019-04-24 NOTE — PROGRESS NOTES
Chief Complaint   Patient presents with    Cardiomyopathy    Coronary Artery Disease       Patient Active Problem List    Diagnosis Date Noted    Breast cancer ([facility] [address] 04/05/2019    Coronary artery disease involving native coronary artery of native heart without angina pectoris 04/05/2019     Overview Note:     A. Purported hx of MI in 2016  B.  PCI [facility] 9/2018: two  2.5 CRYSTAL in mid CX,          Current Outpatient Medications   Medication Sig Dispense Refill    carvedilol (COREG) 6.25 MG tablet TAKE 1 TABLET BY MOUTH TWICE DAILY COREG  5    BRILINTA 90 MG TABS tablet TAKE 1 TABLET BY MOUTH TWICE DAILY AS DIRECTED  3    lisinopril (PRINIVIL;ZESTRIL) 2.5 MG tablet TAKE 1 TABLET BY MOUTH DAILY  0    atorvastatin (LIPITOR) 20 MG tablet TAKE 1 TABLET AT BEDTIME  LIPITOR  3    ASPIR-LOW 81 MG EC tablet TAKE 1 TABLET BY MOUTH ONCE DAILY  3    hydrOXYzine (ATARAX) 25 MG tablet TAKE 1 TABLET BY MOUTH AS NEEDED EVERY 8 HOURS ATARAX  2    oxyCODONE-acetaminophen (PERCOCET)  MG per tablet TAKE ONE TABLET BY MOUTH EVERY 4 TO 6 HOURS AS NEEDED FOR PAIN   30 DAYS     START 04 15 19  END 05 14 19  0    morphine (MS CONTIN) 30 MG extended release tablet TAKE ONE TABLET EVERY TWELVE HOURS   START 04 15 19  END 05 14 19   MS CONTIN  0    LORazepam (ATIVAN) 1 MG tablet TAKE 1 TABLET BY MOUTH TWICE DAILY AS NEEDED ATIVAN  0    diclofenac sodium 1 % GEL APPLY TOPICALLY TO THE AFFECTED AREA AS DIRECTED VOLTAREN GEL  2    ELMIRON 100 MG capsule TAKE 1 CAPSULE BY MOUTH 3 TIMES DAILY BEFORE MEALS  6    CHANTIX 1 MG tablet TAKE ONE TABLET BY MOUTH TWICE A DAY  4    omeprazole (PRILOSEC) 20 MG delayed release capsule TAKE ONE CAPSULE BY MOUTH DAILY PRILOSEC  6    ondansetron (ZOFRAN-ODT) 4 MG disintegrating tablet TAKE TWO TABLETS  8MG  UNDER THE TONGUE EVERY 8 HOURS AS NEEDED ZOFRAN ODT 4MG  2    DULoxetine (CYMBALTA) 60 MG extended release capsule TAKE ONE CAPSULE BY MOUTH DAILY CYMBALTA  2    dicyclomine (BENTYL) 10 MG capsule TAKE ONE CAPSULE BY MOUTH FOUR TIMES A DAY BENTYL  2    cetirizine (ZYRTEC) 10 MG tablet TAKE 1 TABLET BY MOUTH DAILY ZYRTEC  2    gabapentin (NEURONTIN) 100 MG capsule TAKE 1 CAPSULE BY MOUTH THREE TIMES A DAY   300 MG DAILY   NEURONTIN  2    senna (SENOKOT) 8.6 MG tablet Take 1 tablet by mouth as needed for Constipation      Albuterol Sulfate (VENTOLIN HFA IN) Inhale into the lungs as needed      budesonide-formoterol (SYMBICORT) 160-4.5 MCG/ACT AERO Inhale 2 puffs into the lungs 2 times daily       No current facility-administered medications for this visit. Allergies   Allergen Reactions    Alupent [Metaproterenol]     Bee Venom     Reglan [Metoclopramide]     Sodium Phenolsulfonate [Phenolsulfonic Acid]        Vitals:    04/24/19 1037   BP: 110/74   Pulse: 72   Resp: 16   Weight: 206 lb (93.4 kg)   Height: 5' 2\" (1.575 m)       Social History     Socioeconomic History    Marital status:       Spouse name: Not on file    Number of children: Not on file    Years of education: Not on file    Highest education level: Not on file   Occupational History    Not on file   Social Needs    Financial resource strain: Not on file    Food insecurity:     Worry: Not on file     Inability: Not on file    Transportation needs:     Medical: Not on file     Non-medical: Not on file   Tobacco Use    Smoking status: Current Every Day Smoker     Packs/day: 1.00     Types: Cigarettes    Smokeless tobacco: Never Used   Substance and Sexual Activity    Alcohol use: Never     Frequency: Never    Drug use: Never    Sexual activity: Not on file   Lifestyle    Physical activity:     Days per week: Not on file     Minutes per session: Not on file    Stress: Not on file   Relationships    Social connections:     Talks on phone: Not on file     Gets together: Not on file     Attends Ten[employer] service: Not on file     Active member of club or organization: Not on file     Attends meetings of clubs or organizations: Not on file     Relationship status: Not on file    Intimate partner violence:     Fear of current or ex partner: Not on file     Emotionally abused: Not on file     Physically abused: Not on file     Forced sexual activity: Not on file   Other Topics Concern    Not on file   Social History Narrative    Not on file       History reviewed. No pertinent family history. SUBJECTIVE: Vanda Madden presents to the office today for re-evaluation of cardiac diagnoses. She complains of dyspnea and denies   chest pain and syncope. Long hx - I reviewed hundreds of pages. Had septic shock in past, transferred to [facility], LV dysfunction noted, but no regional wall motion abnormality. Had cath and what sounds like incidental CAD stented last fall. EF recovered, now facing more chemo  Now has sob likely due to brilinta for months. Review of Systems:   Heart: as above   Lungs: as above   Eyes: denies changes in vision or discharge. Ears: denies changes in hearing or pain. Nose: denies epistaxis or masses   Throat: denies sore throat or trouble swallowing. Neuro: denies numbness, tingling, tremors. Skin: denies rashes or itching. : denies hematuria, dysuria   GI: denies vomiting, diarrhea   Psych: denies mood changed, anxiety, depression. all others negative. Physical Exam   /74   Pulse 72   Resp 16   Ht 5' 2\" (1.575 m)   Wt 206 lb (93.4 kg)   BMI 37.68 kg/m²   Constitutional: Oriented to person, place, and time. Well-developed and well-nourished. No distress. Head: Normocephalic and atraumatic. Eyes: EOM are normal. Pupils are equal, round, and reactive to light. Neck: Normal range of motion. Neck supple. No hepatojugular reflux and no JVD present. Carotid bruit is not present. No tracheal deviation present. No thyromegaly present. Cardiovascular: Normal rate, regular rhythm, normal heart sounds and intact distal pulses.   Exam reveals no gallop and no

## 2019-05-01 RX ORDER — LISINOPRIL 2.5 MG/1
TABLET ORAL
Qty: 30 TABLET | Refills: 5 | Status: SHIPPED | OUTPATIENT
Start: 2019-05-01

## 2019-06-06 RX ORDER — ASPIRIN 81 MG/1
TABLET ORAL
Qty: 30 TABLET | Refills: 5 | Status: SHIPPED | OUTPATIENT
Start: 2019-06-06

## 2019-06-06 RX ORDER — CARVEDILOL 6.25 MG/1
TABLET ORAL
Qty: 60 TABLET | Refills: 5 | Status: SHIPPED | OUTPATIENT
Start: 2019-06-06